# Patient Record
Sex: FEMALE | Race: WHITE | Employment: FULL TIME | URBAN - METROPOLITAN AREA
[De-identification: names, ages, dates, MRNs, and addresses within clinical notes are randomized per-mention and may not be internally consistent; named-entity substitution may affect disease eponyms.]

---

## 2017-01-05 ENCOUNTER — ROUTINE PRENATAL (OUTPATIENT)
Dept: OBGYN CLINIC | Facility: CLINIC | Age: 27
End: 2017-01-05

## 2017-01-05 VITALS
HEART RATE: 92 BPM | BODY MASS INDEX: 29 KG/M2 | SYSTOLIC BLOOD PRESSURE: 118 MMHG | DIASTOLIC BLOOD PRESSURE: 74 MMHG | WEIGHT: 182 LBS

## 2017-01-05 DIAGNOSIS — Z34.93 ENCOUNTER FOR SUPERVISION OF NORMAL PREGNANCY IN THIRD TRIMESTER, UNSPECIFIED GRAVIDITY: Primary | ICD-10-CM

## 2017-01-05 LAB
APPEARANCE: CLEAR
MULTISTIX EXPIRATION DATE: NORMAL DATE
MULTISTIX LOT#: NORMAL NUMERIC
PH, URINE: 7.5 (ref 4.5–8)
SPECIFIC GRAVITY: 1 (ref 1–1.03)
URINE-COLOR: YELLOW

## 2017-01-05 NOTE — PROGRESS NOTES
TDAP at next visit. Reviewed labs. Patient with some swelling but doing better. Thinks she is having a girl told by [address]  that she is 85% its a girl.   RTC 2 wks

## 2017-01-16 ENCOUNTER — ROUTINE PRENATAL (OUTPATIENT)
Dept: OBGYN CLINIC | Facility: CLINIC | Age: 27
End: 2017-01-16

## 2017-01-16 VITALS
SYSTOLIC BLOOD PRESSURE: 121 MMHG | WEIGHT: 181 LBS | HEART RATE: 72 BPM | BODY MASS INDEX: 29 KG/M2 | DIASTOLIC BLOOD PRESSURE: 72 MMHG

## 2017-01-16 DIAGNOSIS — Z34.93 ENCOUNTER FOR SUPERVISION OF NORMAL PREGNANCY IN THIRD TRIMESTER, UNSPECIFIED GRAVIDITY: Primary | ICD-10-CM

## 2017-01-16 LAB
MULTISTIX EXPIRATION DATE: NORMAL DATE
MULTISTIX LOT#: NORMAL NUMERIC
PH, URINE: 6 (ref 4.5–8)
SPECIFIC GRAVITY: 1.01 (ref 1–1.03)
UROBILINOGEN,SEMI-QN: 0.2 MG/DL (ref 0–1.9)

## 2017-01-31 ENCOUNTER — ROUTINE PRENATAL (OUTPATIENT)
Dept: OBGYN CLINIC | Facility: CLINIC | Age: 27
End: 2017-01-31

## 2017-01-31 VITALS
SYSTOLIC BLOOD PRESSURE: 129 MMHG | DIASTOLIC BLOOD PRESSURE: 85 MMHG | WEIGHT: 184 LBS | BODY MASS INDEX: 29 KG/M2 | HEART RATE: 86 BPM

## 2017-01-31 DIAGNOSIS — Z34.83 ENCOUNTER FOR SUPERVISION OF OTHER NORMAL PREGNANCY IN THIRD TRIMESTER: Primary | ICD-10-CM

## 2017-01-31 LAB
APPEARANCE: CLEAR
MULTISTIX LOT#: ABNORMAL NUMERIC
PH, URINE: 7 (ref 4.5–8)
SPECIFIC GRAVITY: 1 (ref 1–1.03)
URINE-COLOR: YELLOW

## 2017-01-31 PROCEDURE — 90715 TDAP VACCINE 7 YRS/> IM: CPT | Performed by: OBSTETRICS & GYNECOLOGY

## 2017-01-31 PROCEDURE — 90471 IMMUNIZATION ADMIN: CPT | Performed by: OBSTETRICS & GYNECOLOGY

## 2017-01-31 NOTE — PROGRESS NOTES
Pt tolerated Tdap vaccine well to left deltoid. Pt had no adverse reactions and VIS sheet given to pt.

## 2017-02-14 ENCOUNTER — ROUTINE PRENATAL (OUTPATIENT)
Dept: OBGYN CLINIC | Facility: CLINIC | Age: 27
End: 2017-02-14

## 2017-02-14 VITALS
WEIGHT: 185 LBS | HEART RATE: 80 BPM | SYSTOLIC BLOOD PRESSURE: 123 MMHG | BODY MASS INDEX: 29 KG/M2 | DIASTOLIC BLOOD PRESSURE: 75 MMHG

## 2017-02-14 DIAGNOSIS — Z34.03 ENCOUNTER FOR SUPERVISION OF NORMAL FIRST PREGNANCY IN THIRD TRIMESTER: Primary | ICD-10-CM

## 2017-02-14 LAB
APPEARANCE: CLEAR
MULTISTIX LOT#: NORMAL NUMERIC
URINE-COLOR: YELLOW

## 2017-02-28 ENCOUNTER — APPOINTMENT (OUTPATIENT)
Dept: LAB | Facility: HOSPITAL | Age: 27
End: 2017-02-28
Attending: OBSTETRICS & GYNECOLOGY
Payer: COMMERCIAL

## 2017-02-28 ENCOUNTER — ROUTINE PRENATAL (OUTPATIENT)
Dept: OBGYN CLINIC | Facility: CLINIC | Age: 27
End: 2017-02-28

## 2017-02-28 VITALS
WEIGHT: 191 LBS | DIASTOLIC BLOOD PRESSURE: 73 MMHG | BODY MASS INDEX: 30 KG/M2 | SYSTOLIC BLOOD PRESSURE: 119 MMHG | HEART RATE: 73 BPM

## 2017-02-28 DIAGNOSIS — Z34.03 ENCOUNTER FOR SUPERVISION OF NORMAL FIRST PREGNANCY IN THIRD TRIMESTER: ICD-10-CM

## 2017-02-28 DIAGNOSIS — Z34.03 ENCOUNTER FOR SUPERVISION OF NORMAL FIRST PREGNANCY IN THIRD TRIMESTER: Primary | ICD-10-CM

## 2017-02-28 LAB
APPEARANCE: CLEAR
ERYTHROCYTE [DISTWIDTH] IN BLOOD BY AUTOMATED COUNT: 15.2 % (ref 11–15)
HCT VFR BLD AUTO: 28.3 % (ref 35–48)
HGB BLD-MCNC: 9 G/DL (ref 12–16)
MCH RBC QN AUTO: 23.6 PG (ref 27–32)
MCHC RBC AUTO-ENTMCNC: 31.6 G/DL (ref 32–37)
MCV RBC AUTO: 74.7 FL (ref 80–100)
MULTISTIX LOT#: NORMAL NUMERIC
PLATELET # BLD AUTO: 218 K/UL (ref 140–400)
PMV BLD AUTO: 7.8 FL (ref 7.4–10.3)
RBC # BLD AUTO: 3.79 M/UL (ref 3.7–5.4)
URINE-COLOR: YELLOW
WBC # BLD AUTO: 12.1 K/UL (ref 4–11)

## 2017-02-28 PROCEDURE — 85027 COMPLETE CBC AUTOMATED: CPT

## 2017-02-28 PROCEDURE — 36415 COLL VENOUS BLD VENIPUNCTURE: CPT

## 2017-02-28 PROCEDURE — 86780 TREPONEMA PALLIDUM: CPT

## 2017-02-28 NOTE — PROGRESS NOTES
C/o leaking fluid and discharge. Negative nitrizine and pooling. GBS done today. Recommend Iron daily for patient. Increase hydration from cramping. Reviewed kick counts.    RTC 1 wk

## 2017-03-01 LAB — T PALLIDUM AB SER QL: NEGATIVE

## 2017-03-07 ENCOUNTER — ROUTINE PRENATAL (OUTPATIENT)
Dept: OBGYN CLINIC | Facility: CLINIC | Age: 27
End: 2017-03-07

## 2017-03-07 VITALS
HEART RATE: 77 BPM | WEIGHT: 194.63 LBS | DIASTOLIC BLOOD PRESSURE: 78 MMHG | SYSTOLIC BLOOD PRESSURE: 120 MMHG | BODY MASS INDEX: 31 KG/M2

## 2017-03-07 DIAGNOSIS — Z34.93 ENCOUNTER FOR SUPERVISION OF NORMAL PREGNANCY IN THIRD TRIMESTER, UNSPECIFIED GRAVIDITY: Primary | ICD-10-CM

## 2017-03-07 LAB
MULTISTIX LOT#: NORMAL NUMERIC
PH, URINE: 6 (ref 4.5–8)
SPECIFIC GRAVITY: 1.01 (ref 1–1.03)
UROBILINOGEN,SEMI-QN: 0.2 MG/DL (ref 0–1.9)

## 2017-03-13 ENCOUNTER — TELEPHONE (OUTPATIENT)
Dept: OBGYN CLINIC | Facility: CLINIC | Age: 27
End: 2017-03-13

## 2017-03-13 NOTE — TELEPHONE ENCOUNTER
Pt 37w4d calling to report that she started experiencing sharp LLQ abdominal pain that started suddenly while she was standing at work that started 15 minutes ago. Pt stated when the pain first started it was an 8/10 and now is about 5-6/10.  Pt denies LOF

## 2017-03-14 ENCOUNTER — ROUTINE PRENATAL (OUTPATIENT)
Dept: OBGYN CLINIC | Facility: CLINIC | Age: 27
End: 2017-03-14

## 2017-03-14 VITALS
BODY MASS INDEX: 31 KG/M2 | DIASTOLIC BLOOD PRESSURE: 83 MMHG | HEART RATE: 80 BPM | SYSTOLIC BLOOD PRESSURE: 134 MMHG | WEIGHT: 197.38 LBS

## 2017-03-14 DIAGNOSIS — Z34.93 ENCOUNTER FOR SUPERVISION OF NORMAL PREGNANCY IN THIRD TRIMESTER, UNSPECIFIED GRAVIDITY: Primary | ICD-10-CM

## 2017-03-14 LAB
APPEARANCE: CLEAR
MULTISTIX EXPIRATION DATE: NORMAL DATE
MULTISTIX LOT#: NORMAL NUMERIC
PH, URINE: 6.5 (ref 4.5–8)
SPECIFIC GRAVITY: 1 (ref 1–1.03)
URINE-COLOR: YELLOW

## 2017-03-21 ENCOUNTER — ROUTINE PRENATAL (OUTPATIENT)
Dept: OBGYN CLINIC | Facility: CLINIC | Age: 27
End: 2017-03-21

## 2017-03-21 VITALS
HEART RATE: 77 BPM | BODY MASS INDEX: 31 KG/M2 | SYSTOLIC BLOOD PRESSURE: 134 MMHG | DIASTOLIC BLOOD PRESSURE: 84 MMHG | WEIGHT: 198 LBS

## 2017-03-21 DIAGNOSIS — Z34.93 ENCOUNTER FOR SUPERVISION OF NORMAL PREGNANCY IN THIRD TRIMESTER, UNSPECIFIED GRAVIDITY: Primary | ICD-10-CM

## 2017-03-21 LAB
MULTISTIX LOT#: NORMAL NUMERIC
PH, URINE: 7 (ref 4.5–8)
PROTEIN (URINE DIPSTICK): 30 MG/DL
SPECIFIC GRAVITY: 1.01 (ref 1–1.03)
UROBILINOGEN,SEMI-QN: 0.2 MG/DL (ref 0–1.9)

## 2017-03-28 ENCOUNTER — ROUTINE PRENATAL (OUTPATIENT)
Dept: OBGYN CLINIC | Facility: CLINIC | Age: 27
End: 2017-03-28

## 2017-03-28 VITALS
DIASTOLIC BLOOD PRESSURE: 85 MMHG | WEIGHT: 198 LBS | SYSTOLIC BLOOD PRESSURE: 136 MMHG | HEART RATE: 80 BPM | BODY MASS INDEX: 31 KG/M2

## 2017-03-28 DIAGNOSIS — Z34.93 ENCOUNTER FOR SUPERVISION OF NORMAL PREGNANCY IN THIRD TRIMESTER, UNSPECIFIED GRAVIDITY: Primary | ICD-10-CM

## 2017-03-28 LAB
MULTISTIX LOT#: NORMAL NUMERIC
PH, URINE: 6.5 (ref 4.5–8)
SPECIFIC GRAVITY: 1.01 (ref 1–1.03)
UROBILINOGEN,SEMI-QN: 0.2 MG/DL (ref 0–1.9)

## 2017-04-02 ENCOUNTER — ANESTHESIA EVENT (OUTPATIENT)
Dept: OBGYN UNIT | Facility: HOSPITAL | Age: 27
End: 2017-04-02
Payer: COMMERCIAL

## 2017-04-02 ENCOUNTER — HOSPITAL ENCOUNTER (INPATIENT)
Facility: HOSPITAL | Age: 27
LOS: 2 days | Discharge: HOME | End: 2017-04-04
Attending: OBSTETRICS & GYNECOLOGY | Admitting: OBSTETRICS & GYNECOLOGY
Payer: COMMERCIAL

## 2017-04-02 ENCOUNTER — ANESTHESIA (OUTPATIENT)
Dept: OBGYN UNIT | Facility: HOSPITAL | Age: 27
End: 2017-04-02
Payer: COMMERCIAL

## 2017-04-02 DIAGNOSIS — Z34.90 PREGNANCY, UNSPECIFIED GESTATIONAL AGE: Primary | ICD-10-CM

## 2017-04-02 PROCEDURE — 0UQMXZZ REPAIR VULVA, EXTERNAL APPROACH: ICD-10-PCS | Performed by: OBSTETRICS & GYNECOLOGY

## 2017-04-02 PROCEDURE — 86850 RBC ANTIBODY SCREEN: CPT | Performed by: OBSTETRICS & GYNECOLOGY

## 2017-04-02 PROCEDURE — 86901 BLOOD TYPING SEROLOGIC RH(D): CPT | Performed by: OBSTETRICS & GYNECOLOGY

## 2017-04-02 PROCEDURE — 84460 ALANINE AMINO (ALT) (SGPT): CPT | Performed by: OBSTETRICS & GYNECOLOGY

## 2017-04-02 PROCEDURE — 84450 TRANSFERASE (AST) (SGOT): CPT | Performed by: OBSTETRICS & GYNECOLOGY

## 2017-04-02 PROCEDURE — 86900 BLOOD TYPING SEROLOGIC ABO: CPT | Performed by: OBSTETRICS & GYNECOLOGY

## 2017-04-02 PROCEDURE — 0KQM0ZZ REPAIR PERINEUM MUSCLE, OPEN APPROACH: ICD-10-PCS | Performed by: OBSTETRICS & GYNECOLOGY

## 2017-04-02 PROCEDURE — 85027 COMPLETE CBC AUTOMATED: CPT | Performed by: OBSTETRICS & GYNECOLOGY

## 2017-04-02 RX ORDER — TERBUTALINE SULFATE 1 MG/ML
0.25 INJECTION, SOLUTION SUBCUTANEOUS AS NEEDED
Status: DISCONTINUED | OUTPATIENT
Start: 2017-04-02 | End: 2017-04-03

## 2017-04-02 RX ORDER — SODIUM CHLORIDE, SODIUM LACTATE, POTASSIUM CHLORIDE, CALCIUM CHLORIDE 600; 310; 30; 20 MG/100ML; MG/100ML; MG/100ML; MG/100ML
INJECTION, SOLUTION INTRAVENOUS
Status: COMPLETED
Start: 2017-04-02 | End: 2017-04-02

## 2017-04-02 RX ORDER — SODIUM CHLORIDE 0.9 % (FLUSH) 0.9 %
10 SYRINGE (ML) INJECTION AS NEEDED
Status: DISCONTINUED | OUTPATIENT
Start: 2017-04-02 | End: 2017-04-03

## 2017-04-02 RX ORDER — LIDOCAINE HYDROCHLORIDE 10 MG/ML
30 INJECTION, SOLUTION EPIDURAL; INFILTRATION; INTRACAUDAL; PERINEURAL ONCE
Status: DISCONTINUED | OUTPATIENT
Start: 2017-04-02 | End: 2017-04-03

## 2017-04-02 RX ORDER — NALBUPHINE HCL 10 MG/ML
2.5 AMPUL (ML) INJECTION
Status: DISCONTINUED | OUTPATIENT
Start: 2017-04-02 | End: 2017-04-04

## 2017-04-02 RX ORDER — DEXTROSE, SODIUM CHLORIDE, SODIUM LACTATE, POTASSIUM CHLORIDE, AND CALCIUM CHLORIDE 5; .6; .31; .03; .02 G/100ML; G/100ML; G/100ML; G/100ML; G/100ML
125 INJECTION, SOLUTION INTRAVENOUS CONTINUOUS
Status: DISCONTINUED | OUTPATIENT
Start: 2017-04-02 | End: 2017-04-03

## 2017-04-02 RX ORDER — LIDOCAINE HYDROCHLORIDE AND EPINEPHRINE 15; 5 MG/ML; UG/ML
INJECTION, SOLUTION EPIDURAL AS NEEDED
Status: DISCONTINUED | OUTPATIENT
Start: 2017-04-02 | End: 2017-04-03 | Stop reason: SURG

## 2017-04-02 RX ORDER — AMMONIA INHALANTS 0.04 G/.3ML
0.3 INHALANT RESPIRATORY (INHALATION) AS NEEDED
Status: DISCONTINUED | OUTPATIENT
Start: 2017-04-02 | End: 2017-04-03

## 2017-04-02 RX ORDER — BUPIVACAINE HYDROCHLORIDE 2.5 MG/ML
INJECTION, SOLUTION EPIDURAL; INFILTRATION; INTRACAUDAL
Status: COMPLETED
Start: 2017-04-02 | End: 2017-04-02

## 2017-04-02 RX ORDER — EPHEDRINE SULFATE/0.9% NACL/PF 25 MG/5 ML
5 SYRINGE (ML) INTRAVENOUS AS NEEDED
Status: DISCONTINUED | OUTPATIENT
Start: 2017-04-02 | End: 2017-04-03

## 2017-04-02 RX ORDER — IBUPROFEN 600 MG/1
600 TABLET ORAL ONCE AS NEEDED
Status: DISCONTINUED | OUTPATIENT
Start: 2017-04-02 | End: 2017-04-03

## 2017-04-02 RX ADMIN — BUPIVACAINE HYDROCHLORIDE 10 ML: 2.5 INJECTION, SOLUTION EPIDURAL; INFILTRATION; INTRACAUDAL at 19:20:00

## 2017-04-02 RX ADMIN — LIDOCAINE HYDROCHLORIDE AND EPINEPHRINE 5 ML: 15; 5 INJECTION, SOLUTION EPIDURAL at 19:20:00

## 2017-04-02 NOTE — PROGRESS NOTES
Pt is a 23year old female admitted to  E  . Patient presents with:  R/o Labor: uc's starting ar 0400     Pt is  40w3d intra-uterine pregnancy. History obtained, consents signed. Oriented to room, staff, and plan of care.

## 2017-04-02 NOTE — H&P
[facility] Patient Status:  Inpatient    4/15/1990 MRN [id number]   Location [address] C-D Attending Jarvis Stewart MD   Hosp Day # 0 PCP Tiffanie Stevens MD     Date of Admission:  2017 6/C/-1/0    Ctx q 2-3 min  Fetal heart tones:  Baseline 130-140s  Fetal heart variability: moderate and reactive  Decelerations: No      Lab Review:    Results for orders placed or performed during the hospital encounter of 04/02/17  -CBC, PLATELET; NO DIF

## 2017-04-03 PROCEDURE — 85025 COMPLETE CBC W/AUTO DIFF WBC: CPT | Performed by: OBSTETRICS & GYNECOLOGY

## 2017-04-03 PROCEDURE — 51702 INSERT TEMP BLADDER CATH: CPT

## 2017-04-03 PROCEDURE — 36415 COLL VENOUS BLD VENIPUNCTURE: CPT

## 2017-04-03 RX ORDER — DIAPER,BRIEF,INFANT-TODD,DISP
1 EACH MISCELLANEOUS EVERY 6 HOURS PRN
Status: DISCONTINUED | OUTPATIENT
Start: 2017-04-03 | End: 2017-04-04

## 2017-04-03 RX ORDER — SIMETHICONE 80 MG
80 TABLET,CHEWABLE ORAL 3 TIMES DAILY PRN
Status: DISCONTINUED | OUTPATIENT
Start: 2017-04-03 | End: 2017-04-04

## 2017-04-03 RX ORDER — AMMONIA INHALANTS 0.04 G/.3ML
0.3 INHALANT RESPIRATORY (INHALATION) AS NEEDED
Status: DISCONTINUED | OUTPATIENT
Start: 2017-04-03 | End: 2017-04-04

## 2017-04-03 RX ORDER — ONDANSETRON 2 MG/ML
4 INJECTION INTRAMUSCULAR; INTRAVENOUS EVERY 6 HOURS PRN
Status: DISCONTINUED | OUTPATIENT
Start: 2017-04-03 | End: 2017-04-04

## 2017-04-03 RX ORDER — IBUPROFEN 600 MG/1
600 TABLET ORAL EVERY 6 HOURS PRN
Status: DISCONTINUED | OUTPATIENT
Start: 2017-04-03 | End: 2017-04-04

## 2017-04-03 RX ORDER — DOCUSATE SODIUM 100 MG/1
100 CAPSULE, LIQUID FILLED ORAL
Status: DISCONTINUED | OUTPATIENT
Start: 2017-04-03 | End: 2017-04-04

## 2017-04-03 RX ORDER — HYDROCODONE BITARTRATE AND ACETAMINOPHEN 5; 325 MG/1; MG/1
1 TABLET ORAL EVERY 4 HOURS PRN
Status: DISCONTINUED | OUTPATIENT
Start: 2017-04-03 | End: 2017-04-04

## 2017-04-03 RX ORDER — PRENATAL VIT,CAL 76/IRON/FOLIC 29 MG-1 MG
1 TABLET ORAL DAILY
Status: DISCONTINUED | OUTPATIENT
Start: 2017-04-03 | End: 2017-04-04

## 2017-04-03 RX ORDER — SODIUM CHLORIDE 0.9 % (FLUSH) 0.9 %
10 SYRINGE (ML) INJECTION AS NEEDED
Status: DISCONTINUED | OUTPATIENT
Start: 2017-04-03 | End: 2017-04-04

## 2017-04-03 NOTE — ANESTHESIA POSTPROCEDURE EVALUATION
Patient: Rajinder Searing    Procedure Summary     Date Anesthesia Start Anesthesia Stop Room / Location    04/02/17 1920         Procedure Diagnosis Scheduled Providers Responsible Provider    ANESTHESIA LABOR ANALGESIA No diagnosis on file.   Giselle Benitez,

## 2017-04-03 NOTE — ANESTHESIA PREPROCEDURE EVALUATION
Anesthesia PreOp Note    HPI:     Debra Solis is a 23year old female who presents for preoperative consultation requested by: * No surgeons listed *    Date of Surgery: * No dates entered *    * No procedures listed *  Indication: * No pre-op diagnosis Octavio Jara MD     fentaNYL citrate (SUBLIMAZE) 0.05 MG/ML injection 100 mcg 100 mcg Intravenous Once Octavio Jara MD     lactated ringers infusion           No current [facility]-ordered outpatient prescriptions on file.     No Known Allergies    No family histor Discussed With:  Patient and spouse      I have informed Jase Agustin  of the nature of the anesthetic plan, benefits, risks, major complications, and any alternative forms of anesthetic management.    All of the patient's questions were answered to the be

## 2017-04-03 NOTE — LACTATION NOTE
LACTATION NOTE - MOTHER           Problems identified  Problems identified: Knowledge deficit; Nipple pain; Nipple trauma    Maternal history  Maternal history: Anxiety    Breastfeeding goal  Breastfeeding goal: To maintain breast milk feeding per patient go

## 2017-04-03 NOTE — PROGRESS NOTES
Beside report received from ROLAND Whitney Bed in locked and low position. Side rails up x 2. VSS. Fundus firm at u/u, lochia small, no clots noted. IV site unremarkable. Pain level 5.  Baby girl present at bedside in open crib, ID bands checked and verified

## 2017-04-03 NOTE — PLAN OF CARE
Problem: POSTPARTUM  Goal: Optimize infant feeding at the breast  INTERVENTIONS:  - Initiate breast feeding within first hour after birth. - Monitor effectiveness of current breast feeding efforts. - Assess support systems available to mother/family.   - regarding lactation, letdown techniques, maternal food preferences.   - Assess mother’s knowledge and previous experience with breast feeding.  - Provide information as needed about early infant feeding cues (e.g., rooting, lip smacking, sucking fingers/ang

## 2017-04-03 NOTE — PROGRESS NOTES
[place] [facility]    Post  Progress Note      Silver Garcia Patient Status:  Inpatient    4/15/1990 MRN [id number]   Location [facility] 3SE Attending Som Kemp MD   Hosp Day # 1 PCP Abdulaziz Brown MD       Subjective     Good marcy

## 2017-04-03 NOTE — LACTATION NOTE
This note was copied from the chart of Girl  Mendel Cobian.   LACTATION NOTE - INFANT    Evaluation Type  Evaluation Type: Inpatient    Problems & Assessment  Problems Diagnosed or Identified: Shallow latch  Infant Assessment: Good skin turgor;Skin color: pink or shallow,difficulty latching causing nipple pain and damage. Initially baby curling upper lip under,sucking on lip or mom's areola causing bruising. LC showed mom how to gently carolina lips to promote deeper more comfortable latch.  Mom able to HE drops colost

## 2017-04-03 NOTE — PLAN OF CARE
Problem: POSTPARTUM  Goal: Optimize infant feeding at the breast  INTERVENTIONS:  - Initiate breast feeding within first hour after birth. - Monitor effectiveness of current breast feeding efforts. - Assess support systems available to mother/family.   - with breast feeding.  - Provide information as needed about early infant feeding cues (e.g., rooting, lip smacking, sucking fingers/hand) versus late cue of crying.  - Discuss/demonstrate breast feeding aids (e.g., infant sling, nursing footstool/pillows,

## 2017-04-03 NOTE — ANESTHESIA PROCEDURE NOTES
Labor Analgesia  Performed by: Vaishnavi Muñiz  Authorized by: Vaishnavi Muñiz    Patient Location:  OB  Start Time:  4/2/2017 7:20 PM  End Time:  4/2/2017 7:30 PM  Site Identification: surface landmarks    Reason for Block: labor epidural    Anesthesiologis

## 2017-04-03 NOTE — L&D DELIVERY NOTE
[facility]    Vaginal Delivery Note    Daniel Robles Patient Status:  Inpatient    4/15/1990 MRN [id number]   Location [facility] Attending Samantha Hess MD   Hosp Day # 0 PCP Tee Kevin MD       Delivery     Infant  Dionicio

## 2017-04-03 NOTE — DISCHARGE SUMMARY
[facility]    Discharge Summary    Magda Thapa Patient Status:  Inpatient    4/15/1990 MRN [id number]   Location [facility] Attending Jaun Chapman MD   Hosp Day # 0       Delivering OB Clinician:  Dr Aman Wray 39: Estimated

## 2017-04-04 VITALS
HEART RATE: 78 BPM | SYSTOLIC BLOOD PRESSURE: 121 MMHG | DIASTOLIC BLOOD PRESSURE: 64 MMHG | TEMPERATURE: 97 F | RESPIRATION RATE: 18 BRPM

## 2017-04-04 RX ORDER — IBUPROFEN 600 MG/1
600 TABLET ORAL EVERY 6 HOURS PRN
Qty: 30 TABLET | Refills: 0 | Status: SHIPPED | OUTPATIENT
Start: 2017-04-04 | End: 2017-05-15 | Stop reason: ALTCHOICE

## 2017-04-04 RX ORDER — PSEUDOEPHEDRINE HCL 30 MG
100 TABLET ORAL 2 TIMES DAILY
Qty: 60 CAPSULE | Refills: 0 | Status: SHIPPED | OUTPATIENT
Start: 2017-04-04 | End: 2017-05-15 | Stop reason: ALTCHOICE

## 2017-04-04 RX ORDER — HYDROCODONE BITARTRATE AND ACETAMINOPHEN 5; 325 MG/1; MG/1
1 TABLET ORAL EVERY 6 HOURS PRN
Qty: 15 TABLET | Refills: 0 | Status: SHIPPED | OUTPATIENT
Start: 2017-04-04 | End: 2017-05-15 | Stop reason: ALTCHOICE

## 2017-04-04 NOTE — LACTATION NOTE
LACTATION NOTE - MOTHER           Problems identified  Problems identified: Knowledge deficit; Nipple trauma  Problems Identified Other: nipple damage healing    Maternal history  Maternal history: Anxiety    Breastfeeding goal  Breastfeeding goal: To maint group.

## 2017-04-04 NOTE — LACTATION NOTE
This note was copied from the chart of Erlin Adams.   LACTATION NOTE - INFANT    Evaluation Type  Evaluation Type: Inpatient    Problems & Assessment  Problems Diagnosed or Identified: Latch difficulty  Infant Assessment: Minimal hunger cues present;Skin c management; Outpatient lactation clinic handout  Evaluation of education: Mother verbalized understanding;Significant other included in teaching

## 2017-04-11 ENCOUNTER — TELEPHONE (OUTPATIENT)
Dept: OBGYN CLINIC | Facility: CLINIC | Age: 27
End: 2017-04-11

## 2017-04-11 NOTE — TELEPHONE ENCOUNTER
----- Message from [address] [place] [address] [address] Generic sent at 4/11/2017  6:00 AM CDT -----  Regarding: Other  Contact: [phone]  I had my baby last Sunday (4-2), and I have a vague recollection of the doctor saying I will experience some cramps and pain for about the Claudis Stinson

## 2017-04-15 NOTE — TELEPHONE ENCOUNTER
Pt had vaginal delivery with JLK on 4/2 and is calling today to report \"a bad ache/pressure\" right below her tailbone and into her anus area for the past week. Pt stated she notices the pain after urination or right before she has a BM and during BMs.  Pt

## 2017-04-15 NOTE — TELEPHONE ENCOUNTER
Pt informed of JLKs recs and verbalized understanding. Pt instructed to call office if recs do not help pain or if pain worsens.

## 2017-05-06 ENCOUNTER — HOSPITAL ENCOUNTER (OUTPATIENT)
Age: 27
Discharge: OTHER INSTITUTION | End: 2017-05-06
Attending: EMERGENCY MEDICINE
Payer: COMMERCIAL

## 2017-05-06 ENCOUNTER — APPOINTMENT (OUTPATIENT)
Dept: GENERAL RADIOLOGY | Age: 27
End: 2017-05-06
Attending: EMERGENCY MEDICINE
Payer: COMMERCIAL

## 2017-05-06 VITALS
TEMPERATURE: 97 F | BODY MASS INDEX: 25.71 KG/M2 | WEIGHT: 160 LBS | DIASTOLIC BLOOD PRESSURE: 66 MMHG | HEIGHT: 66 IN | RESPIRATION RATE: 18 BRPM | HEART RATE: 96 BPM | OXYGEN SATURATION: 100 % | SYSTOLIC BLOOD PRESSURE: 116 MMHG

## 2017-05-06 DIAGNOSIS — R07.9 RIGHT-SIDED CHEST PAIN: Primary | ICD-10-CM

## 2017-05-06 PROCEDURE — 99215 OFFICE O/P EST HI 40 MIN: CPT

## 2017-05-06 PROCEDURE — 99205 OFFICE O/P NEW HI 60 MIN: CPT

## 2017-05-06 PROCEDURE — 71020 XR CHEST PA + LAT CHEST (CPT=71020): CPT | Performed by: EMERGENCY MEDICINE

## 2017-05-06 NOTE — ED PROVIDER NOTES
Patient Seen in: [facility]    History   Patient presents with:   Other    Stated Complaint: Rib pain    HPI    The patient is a 52-year-old female who is 5 weeks postpartum complaints of sudden onset right parasternal ch reviewed and negative except as noted above. PSFH elements reviewed from today and agreed except as otherwise stated in HPI.     Physical Exam       ED Triage Vitals   BP 05/06/17 1129 116/66 mmHg   Pulse 05/06/17 1129 96   Resp 05/06/17 1129 18   Temp 0

## 2017-05-06 NOTE — ED INITIAL ASSESSMENT (HPI)
Pt has pain above right breast, radiating to right shoulder and right rib cage. Pt denies trauma. Pt has limited range of motion r/t pain. Pt denies SOB. Pain NOS, might have picked up daughter but unsure.

## 2017-05-15 ENCOUNTER — POSTPARTUM (OUTPATIENT)
Dept: OBGYN CLINIC | Facility: CLINIC | Age: 27
End: 2017-05-15

## 2017-05-15 VITALS
WEIGHT: 164 LBS | SYSTOLIC BLOOD PRESSURE: 107 MMHG | HEART RATE: 90 BPM | BODY MASS INDEX: 26 KG/M2 | DIASTOLIC BLOOD PRESSURE: 71 MMHG

## 2017-05-15 RX ORDER — MULTIVITAMIN
1 TABLET ORAL
COMMUNITY
End: 2017-05-15 | Stop reason: ALTCHOICE

## 2017-05-15 RX ORDER — FERROUS SULFATE 325(65) MG
325 TABLET ORAL
COMMUNITY
End: 2017-08-09

## 2017-05-15 NOTE — PROGRESS NOTES
DEVON    Candida Walton is a 33year old female  here for 6 week post-partum visit. Patient delivered a  female infant on 17 by . Patient desires condoms for contraception. Patient is breast feeding.    Patient No symptoms of depression

## 2017-05-19 ENCOUNTER — TELEPHONE (OUTPATIENT)
Dept: OBGYN CLINIC | Facility: CLINIC | Age: 27
End: 2017-05-19

## 2017-05-19 NOTE — TELEPHONE ENCOUNTER
Pt states she is exclusively breastfeeding and asking if it is normal to get a period. Pt reassured that some women get a period while breast feeding and it is normal. Pt advised to call us if bleeding becomes heavy and she soaks through a pad/hour.  Pt als

## 2017-05-19 NOTE — TELEPHONE ENCOUNTER
7 week post partum, 100 % breast feeding and pt just got her period she is not sure if that is normal ?

## 2017-06-30 ENCOUNTER — OFFICE VISIT (OUTPATIENT)
Dept: OTOLARYNGOLOGY | Facility: CLINIC | Age: 27
End: 2017-06-30

## 2017-06-30 VITALS
HEART RATE: 90 BPM | HEIGHT: 66 IN | TEMPERATURE: 99 F | DIASTOLIC BLOOD PRESSURE: 64 MMHG | WEIGHT: 164 LBS | BODY MASS INDEX: 26.36 KG/M2 | SYSTOLIC BLOOD PRESSURE: 111 MMHG

## 2017-06-30 DIAGNOSIS — R49.0 HOARSENESS: Primary | ICD-10-CM

## 2017-06-30 PROCEDURE — 31575 DIAGNOSTIC LARYNGOSCOPY: CPT | Performed by: OTOLARYNGOLOGY

## 2017-06-30 PROCEDURE — 99243 OFF/OP CNSLTJ NEW/EST LOW 30: CPT | Performed by: OTOLARYNGOLOGY

## 2017-08-07 ENCOUNTER — TELEPHONE (OUTPATIENT)
Dept: OBGYN CLINIC | Facility: CLINIC | Age: 27
End: 2017-08-07

## 2017-08-07 NOTE — TELEPHONE ENCOUNTER
Pt calling to report that 2 nights ago after she had IC she noticed vaginal bleeding and cramping. Pt stated she has had \"spotting\" after IC in the past but it was usually when pt \"was dry\" and did not have any lubricant.  Pt stated that she was not dry

## 2017-08-07 NOTE — TELEPHONE ENCOUNTER
Bleeding after intercourse and cramping enough that she has to wipe a couple times for blood to be clear. First time there has been this much blood. Has spotted before when there was not enough lubricant. But there was enough lubricant this time.  4 months

## 2017-08-08 NOTE — TELEPHONE ENCOUNTER
Appt scheduled with KCB tomorrow at 10 at [facility]. Pt will check on referral as was told by PCP needs referral. Pt to call and reschedule if referral not authorized yet.

## 2017-08-09 ENCOUNTER — TELEPHONE (OUTPATIENT)
Dept: OBGYN CLINIC | Facility: CLINIC | Age: 27
End: 2017-08-09

## 2017-08-09 ENCOUNTER — OFFICE VISIT (OUTPATIENT)
Dept: OBGYN CLINIC | Facility: CLINIC | Age: 27
End: 2017-08-09

## 2017-08-09 VITALS
BODY MASS INDEX: 24 KG/M2 | WEIGHT: 146 LBS | DIASTOLIC BLOOD PRESSURE: 69 MMHG | HEART RATE: 81 BPM | SYSTOLIC BLOOD PRESSURE: 114 MMHG

## 2017-08-09 DIAGNOSIS — K60.2 ANAL FISSURE: Primary | ICD-10-CM

## 2017-08-09 DIAGNOSIS — N93.0 POSTCOITAL BLEEDING: Primary | ICD-10-CM

## 2017-08-09 DIAGNOSIS — K60.2 ANAL FISSURE: ICD-10-CM

## 2017-08-09 PROCEDURE — 99213 OFFICE O/P EST LOW 20 MIN: CPT | Performed by: OBSTETRICS & GYNECOLOGY

## 2017-08-09 NOTE — PROGRESS NOTES
Tawanna Falcon is a 33year old female [id number] Patient's last menstrual period was 05/18/2017. Patient presents with:  Gyn Problem: bleeding after intercourse   Patient presents today for postcoital bleeding.  She states she has had this rarely since d control/ protection: Condom     Other Topics Concern   None on file     Social History Narrative   None on file       MEDICATIONS:    Current Outpatient Prescriptions:   •  acetaminophen 500 MG Oral Tab, Take 1,000 mg by mouth., Disp: , Rfl:   •  PRENATAL US    Anal Fissure  1.  Referral to colo-rectal surgeon

## 2017-08-10 NOTE — TELEPHONE ENCOUNTER
Lmtcb.     Stat referral for rectal colon surgery in process, patient can call [phone] to schedule an appointment.

## 2017-08-11 LAB
C TRACH DNA SPEC QL NAA+PROBE: NEGATIVE
GENITAL VAGINOSIS SCREEN: NEGATIVE
N GONORRHOEA DNA SPEC QL NAA+PROBE: NEGATIVE
TRICHOMONAS SCREEN: NEGATIVE

## 2017-08-14 ENCOUNTER — TELEPHONE (OUTPATIENT)
Dept: OBGYN CLINIC | Facility: CLINIC | Age: 27
End: 2017-08-14

## 2017-08-14 NOTE — TELEPHONE ENCOUNTER
----- Message from Shine Washington MD sent at 8/14/2017  9:38 AM CDT -----  Please let patient know that all her testing was negative (pap smear, gc/ct and vaginal culture).  If she continues to have postcoital bleeding she will need to call and we can get

## 2017-08-22 ENCOUNTER — OFFICE VISIT (OUTPATIENT)
Dept: SURGERY | Facility: CLINIC | Age: 27
End: 2017-08-22

## 2017-08-22 VITALS — HEIGHT: 66 IN | BODY MASS INDEX: 22.5 KG/M2 | WEIGHT: 140 LBS

## 2017-08-22 DIAGNOSIS — K60.2 ANAL FISSURE: Primary | ICD-10-CM

## 2017-08-22 PROCEDURE — 99244 OFF/OP CNSLTJ NEW/EST MOD 40: CPT | Performed by: SURGERY

## 2017-08-22 PROCEDURE — 99212 OFFICE O/P EST SF 10 MIN: CPT | Performed by: SURGERY

## 2017-08-22 NOTE — PROGRESS NOTES
History and Physical      Ny Peña is a 33year old female. HPI   Patient presents with:  Anal Problem (GI): Pt referred by Dr. Jose Junior regarding anal fissure since April after the birth of her 1st child.   Pt states it feels like razor blade negatives noted in the the HPI. PHYSICAL EXAM   Body mass index is 22.6 kg/m². Patient's last menstrual period was 05/18/2017.   Constitutional: appears well hydrated alert and responsive no acute distress noted  Head/Face: normocephalic  Nose/Mouth women.  She will continue liberal water intake and fiber. F/u in 4-6 weeks.            8/22/2017  Merline Rudd, MD

## 2017-10-05 ENCOUNTER — TELEPHONE (OUTPATIENT)
Dept: OTOLARYNGOLOGY | Facility: CLINIC | Age: 27
End: 2017-10-05

## 2017-10-05 NOTE — TELEPHONE ENCOUNTER
Pt received a letter from dr Brenda Irby from my chart. Pt is not going to have the test done. Pt is no longer having the problems.

## 2018-01-04 ENCOUNTER — TELEPHONE (OUTPATIENT)
Dept: PEDIATRICS CLINIC | Facility: CLINIC | Age: 28
End: 2018-01-04

## 2018-01-04 ENCOUNTER — APPOINTMENT (OUTPATIENT)
Dept: LAB | Facility: HOSPITAL | Age: 28
End: 2018-01-04
Attending: OBSTETRICS & GYNECOLOGY
Payer: COMMERCIAL

## 2018-01-04 DIAGNOSIS — O20.0 THREATENED MISCARRIAGE: ICD-10-CM

## 2018-01-04 DIAGNOSIS — O20.0 THREATENED MISCARRIAGE: Primary | ICD-10-CM

## 2018-01-04 LAB — B-HCG SERPL-ACNC: 24.4 MIU/ML

## 2018-01-04 PROCEDURE — 84702 CHORIONIC GONADOTROPIN TEST: CPT

## 2018-01-04 PROCEDURE — 36415 COLL VENOUS BLD VENIPUNCTURE: CPT

## 2018-01-04 NOTE — TELEPHONE ENCOUNTER
Informed pt that [facility] wants her to do hcg quants and repeat them in 48 hours. Pt aware to call after the quants for the results. Informed pt call if her bleeding increases.

## 2018-01-04 NOTE — TELEPHONE ENCOUNTER
Pt states that she has pinkish red bleeding that started today. Denies cramping. Pt states that she had sex yesterday. Denies any BM. (Pt had a baby with us 9 months ago.)  Pt is breast feeding. LMP 12-1-17, 4w6d.   Paged [facility].

## 2018-01-04 NOTE — TELEPHONE ENCOUNTER
Pt's lmp was Dec, 1 and confirmed pregnancy with home test. And states she started bleeding. Please advise.

## 2018-01-06 ENCOUNTER — APPOINTMENT (OUTPATIENT)
Dept: LAB | Facility: HOSPITAL | Age: 28
End: 2018-01-06
Attending: OBSTETRICS & GYNECOLOGY
Payer: COMMERCIAL

## 2018-01-06 DIAGNOSIS — O20.0 THREATENED MISCARRIAGE: ICD-10-CM

## 2018-01-06 LAB — B-HCG SERPL-ACNC: 7.2 MIU/ML

## 2018-01-06 PROCEDURE — 36415 COLL VENOUS BLD VENIPUNCTURE: CPT

## 2018-01-06 PROCEDURE — 84702 CHORIONIC GONADOTROPIN TEST: CPT

## 2018-01-08 ENCOUNTER — TELEPHONE (OUTPATIENT)
Dept: PEDIATRICS CLINIC | Facility: CLINIC | Age: 28
End: 2018-01-08

## 2018-01-08 DIAGNOSIS — O03.9 SPONTANEOUS ABORTION: Primary | ICD-10-CM

## 2018-01-08 NOTE — TELEPHONE ENCOUNTER
Pt calling for hcg results. She had a +HPT, LMP 12/1, c/o VB and was ordered to do quants x 2. Hcg dropped from 24 to 7. Routed to [facility] on call. Pt is not yet aware of results.

## 2018-01-09 NOTE — TELEPHONE ENCOUNTER
Pt calling and states she never received a call and states ok to leave a detailed Vm.  Please advise

## 2018-01-10 NOTE — TELEPHONE ENCOUNTER
Agree with triage advice given. Called patient to check on how she is doing. Left message for her. She can call with any questions or concerns.

## 2018-01-10 NOTE — TELEPHONE ENCOUNTER
Pt contacted and results given. Pt aware Kita Galeazzi went from 24 to 7 and that this is typical of a miscarriage. Pt advised to repeat level in 1 week. Pt denies any heavy bleeding or cramping. Pt to call if soaking a pad an hour or if she has pain.  Pt asking wh

## 2018-01-20 ENCOUNTER — APPOINTMENT (OUTPATIENT)
Dept: LAB | Facility: HOSPITAL | Age: 28
End: 2018-01-20
Attending: OBSTETRICS & GYNECOLOGY
Payer: COMMERCIAL

## 2018-01-20 DIAGNOSIS — O03.9 SPONTANEOUS ABORTION: ICD-10-CM

## 2018-01-20 LAB — B-HCG SERPL-ACNC: 0.7 MIU/ML

## 2018-01-20 PROCEDURE — 84702 CHORIONIC GONADOTROPIN TEST: CPT

## 2018-01-20 PROCEDURE — 36415 COLL VENOUS BLD VENIPUNCTURE: CPT

## 2018-01-23 ENCOUNTER — OFFICE VISIT (OUTPATIENT)
Dept: OBGYN CLINIC | Facility: CLINIC | Age: 28
End: 2018-01-23

## 2018-01-23 ENCOUNTER — TELEPHONE (OUTPATIENT)
Dept: PEDIATRICS CLINIC | Facility: CLINIC | Age: 28
End: 2018-01-23

## 2018-01-23 VITALS
BODY MASS INDEX: 23 KG/M2 | SYSTOLIC BLOOD PRESSURE: 110 MMHG | HEART RATE: 65 BPM | DIASTOLIC BLOOD PRESSURE: 71 MMHG | WEIGHT: 141.81 LBS

## 2018-01-23 DIAGNOSIS — R10.2 PELVIC PAIN: Primary | ICD-10-CM

## 2018-01-23 PROCEDURE — 99213 OFFICE O/P EST LOW 20 MIN: CPT | Performed by: OBSTETRICS & GYNECOLOGY

## 2018-01-23 NOTE — TELEPHONE ENCOUNTER
C/O INTENSE, FREQUENT, SHOOTING ABDOMINAL PAIN FOR SEVERAL DAYS. WONDERED IF THIS IS A RESULT OF THE MISCARRIAGE. PT HAD NEGATIVE QUANT ON 1-20-18. SAID THE PAIN PULSES THEN DECREASES, RATES PAIN 7/10 WHEN IT IS PULSING.   SAID THE PULSING PAIN ACTUALLY

## 2018-01-24 NOTE — PROGRESS NOTES
Teri Garay is a 33year old female [id number] Patient's last menstrual period was 01/04/2018. Patient presents with:  Gyn Problem: upper mid ab pain    Last seen at [facility] visit in 2017. Had chemical pregnancy earlier this month.   [facility]  1/6 without tenderness  Adnexa: normal without masses or tenderness    Assessment & Plan:  1. Pelvic pain  Reassurance. Monitor. If pain persists, see PCP.    RTC prn        No prescriptions requested or ordered in this encounter

## 2018-03-09 ENCOUNTER — TELEPHONE (OUTPATIENT)
Dept: OBGYN CLINIC | Facility: CLINIC | Age: 28
End: 2018-03-09

## 2018-03-09 DIAGNOSIS — Z34.90 PREGNANCY, UNSPECIFIED GESTATIONAL AGE: Primary | ICD-10-CM

## 2018-03-09 NOTE — TELEPHONE ENCOUNTER
Pt confirms +HPT and LMP 2/2. She is not taking a PNV but will start today. She delivered with us in 2017 and is aware she will see all 6 MDs. She is aware the first appt is with a nurse.  She accepted an OBN PC appt and w2ill go to lab for serum hcg at [place] [address]

## 2018-03-23 ENCOUNTER — APPOINTMENT (OUTPATIENT)
Dept: LAB | Facility: HOSPITAL | Age: 28
End: 2018-03-23
Attending: OBSTETRICS & GYNECOLOGY
Payer: COMMERCIAL

## 2018-03-23 DIAGNOSIS — Z34.90 PREGNANCY, UNSPECIFIED GESTATIONAL AGE: ICD-10-CM

## 2018-03-23 LAB — HCG SERPL QL: POSITIVE

## 2018-03-23 PROCEDURE — 84703 CHORIONIC GONADOTROPIN ASSAY: CPT

## 2018-03-23 PROCEDURE — 36415 COLL VENOUS BLD VENIPUNCTURE: CPT

## 2018-03-28 ENCOUNTER — NURSE ONLY (OUTPATIENT)
Dept: OBGYN CLINIC | Facility: CLINIC | Age: 28
End: 2018-03-28

## 2018-03-28 VITALS — BODY MASS INDEX: 22.82 KG/M2 | WEIGHT: 142 LBS | HEIGHT: 66 IN

## 2018-03-28 DIAGNOSIS — Z3A.01 LESS THAN 8 WEEKS GESTATION OF PREGNANCY: Primary | ICD-10-CM

## 2018-03-28 PROCEDURE — 99211 OFF/OP EST MAY X REQ PHY/QHP: CPT | Performed by: OBSTETRICS & GYNECOLOGY

## 2018-03-28 NOTE — PROGRESS NOTES
Pt had OBN PC appt today with no complaints. Normal PN labs plus varicella ordered. Pt advised all labs must be completed and resulted prior to MD appt.   Pt scheduled NPN appt with MD. Papo Echeverria name is Tiffani Ramirez contact #[phone]; race: cauc Glioblastoma    Patient or baby's father had a child with birth defects not listed above No    Previous miscarriages or stillborn Yes  Chemical preg x 1

## 2018-04-15 ENCOUNTER — LAB ENCOUNTER (OUTPATIENT)
Dept: LAB | Facility: HOSPITAL | Age: 28
End: 2018-04-15
Attending: OBSTETRICS & GYNECOLOGY
Payer: COMMERCIAL

## 2018-04-15 DIAGNOSIS — Z3A.01 LESS THAN 8 WEEKS GESTATION OF PREGNANCY: ICD-10-CM

## 2018-04-15 PROCEDURE — 87086 URINE CULTURE/COLONY COUNT: CPT

## 2018-04-15 PROCEDURE — 86901 BLOOD TYPING SEROLOGIC RH(D): CPT

## 2018-04-15 PROCEDURE — 86780 TREPONEMA PALLIDUM: CPT

## 2018-04-15 PROCEDURE — 86900 BLOOD TYPING SEROLOGIC ABO: CPT

## 2018-04-15 PROCEDURE — 85025 COMPLETE CBC W/AUTO DIFF WBC: CPT

## 2018-04-15 PROCEDURE — 87389 HIV-1 AG W/HIV-1&-2 AB AG IA: CPT

## 2018-04-15 PROCEDURE — 86762 RUBELLA ANTIBODY: CPT

## 2018-04-15 PROCEDURE — 36415 COLL VENOUS BLD VENIPUNCTURE: CPT

## 2018-04-15 PROCEDURE — 87340 HEPATITIS B SURFACE AG IA: CPT

## 2018-04-15 PROCEDURE — 86787 VARICELLA-ZOSTER ANTIBODY: CPT

## 2018-04-15 PROCEDURE — 86850 RBC ANTIBODY SCREEN: CPT

## 2018-04-19 ENCOUNTER — HOSPITAL ENCOUNTER (OUTPATIENT)
Dept: ULTRASOUND IMAGING | Facility: HOSPITAL | Age: 28
Discharge: HOME | End: 2018-04-19
Attending: OBSTETRICS & GYNECOLOGY
Payer: COMMERCIAL

## 2018-04-19 ENCOUNTER — INITIAL PRENATAL (OUTPATIENT)
Dept: OBGYN CLINIC | Facility: CLINIC | Age: 28
End: 2018-04-19

## 2018-04-19 VITALS
DIASTOLIC BLOOD PRESSURE: 75 MMHG | WEIGHT: 145.19 LBS | SYSTOLIC BLOOD PRESSURE: 110 MMHG | HEART RATE: 81 BPM | BODY MASS INDEX: 23 KG/M2

## 2018-04-19 DIAGNOSIS — IMO0002 FETAL HEART TONES NOT HEARD: ICD-10-CM

## 2018-04-19 DIAGNOSIS — Z34.91 ENCOUNTER FOR SUPERVISION OF NORMAL PREGNANCY IN FIRST TRIMESTER, UNSPECIFIED GRAVIDITY: Primary | ICD-10-CM

## 2018-04-19 PROCEDURE — 76817 TRANSVAGINAL US OBSTETRIC: CPT | Performed by: OBSTETRICS & GYNECOLOGY

## 2018-04-19 PROCEDURE — 81002 URINALYSIS NONAUTO W/O SCOPE: CPT | Performed by: OBSTETRICS & GYNECOLOGY

## 2018-04-19 PROCEDURE — 76801 OB US < 14 WKS SINGLE FETUS: CPT | Performed by: OBSTETRICS & GYNECOLOGY

## 2018-04-19 NOTE — PROGRESS NOTES
Per JUSTEN V.O. For hold & call d/t no heart tones. Per Sisi Gutiérrez pt schedule at 7pm to drink 32 oz and hold until appt - blue/green parking diagnostic main. JUSTEN informed will let pt know. Awaiting CAP on-call to call back.

## 2018-04-20 ENCOUNTER — TELEPHONE (OUTPATIENT)
Dept: OBGYN CLINIC | Facility: CLINIC | Age: 28
End: 2018-04-20

## 2018-04-20 DIAGNOSIS — O02.1 MISSED ABORTION: Primary | ICD-10-CM

## 2018-04-20 NOTE — PROGRESS NOTES
32 yo  @ 10w6d here for NOB visit. NO complaints. No FHTs noted on BSUS. Sent for Hold and Call [address]. On call doc notified.

## 2018-04-20 NOTE — TELEPHONE ENCOUNTER
Spoke to [facility] regarding ultrasound results. We reviewed expectant management, Cytotec, and suction D&C. Risks and benefits of each approach were reviewed in detail.   The patient thinks she would like to expectantly manage at this time and reassess in 1

## 2018-04-20 NOTE — TELEPHONE ENCOUNTER
Pt decline to speak to the nurse's would like to speak to Dr Antonia Orozco regarding her miscarriage

## 2018-04-21 NOTE — TELEPHONE ENCOUNTER
Pt states she want to do cytotec for SAB. Pt requesting appt on Monday morning. Pt accepted appt with [facility]. Reminded pt on doing baseline Quant on Monday. Pt verbalized understanding.

## 2018-04-23 ENCOUNTER — APPOINTMENT (OUTPATIENT)
Dept: LAB | Facility: HOSPITAL | Age: 28
End: 2018-04-23
Attending: OBSTETRICS & GYNECOLOGY
Payer: COMMERCIAL

## 2018-04-23 ENCOUNTER — HOSPITAL ENCOUNTER (OUTPATIENT)
Facility: HOSPITAL | Age: 28
Setting detail: OBSERVATION
Discharge: HOME | End: 2018-04-24
Attending: OBSTETRICS & GYNECOLOGY | Admitting: OBSTETRICS & GYNECOLOGY
Payer: COMMERCIAL

## 2018-04-23 ENCOUNTER — ANESTHESIA EVENT (OUTPATIENT)
Dept: SURGERY | Facility: HOSPITAL | Age: 28
End: 2018-04-23
Payer: COMMERCIAL

## 2018-04-23 ENCOUNTER — OFFICE VISIT (OUTPATIENT)
Dept: OBGYN CLINIC | Facility: CLINIC | Age: 28
End: 2018-04-23

## 2018-04-23 ENCOUNTER — APPOINTMENT (OUTPATIENT)
Dept: ULTRASOUND IMAGING | Facility: HOSPITAL | Age: 28
End: 2018-04-23
Attending: NURSE PRACTITIONER
Payer: COMMERCIAL

## 2018-04-23 ENCOUNTER — SURGERY (OUTPATIENT)
Age: 28
End: 2018-04-23

## 2018-04-23 ENCOUNTER — ANESTHESIA (OUTPATIENT)
Dept: SURGERY | Facility: HOSPITAL | Age: 28
End: 2018-04-23
Payer: COMMERCIAL

## 2018-04-23 ENCOUNTER — TELEPHONE (OUTPATIENT)
Dept: PEDIATRICS CLINIC | Facility: CLINIC | Age: 28
End: 2018-04-23

## 2018-04-23 VITALS
BODY MASS INDEX: 24 KG/M2 | SYSTOLIC BLOOD PRESSURE: 104 MMHG | HEART RATE: 66 BPM | DIASTOLIC BLOOD PRESSURE: 65 MMHG | WEIGHT: 147 LBS

## 2018-04-23 DIAGNOSIS — O02.1 MISSED ABORTION: Primary | ICD-10-CM

## 2018-04-23 DIAGNOSIS — O02.1 MISSED ABORTION: ICD-10-CM

## 2018-04-23 PROCEDURE — 76801 OB US < 14 WKS SINGLE FETUS: CPT | Performed by: NURSE PRACTITIONER

## 2018-04-23 PROCEDURE — 84702 CHORIONIC GONADOTROPIN TEST: CPT

## 2018-04-23 PROCEDURE — 59820 CARE OF MISCARRIAGE: CPT | Performed by: OBSTETRICS & GYNECOLOGY

## 2018-04-23 PROCEDURE — 59200 INSERT CERVICAL DILATOR: CPT | Performed by: OBSTETRICS & GYNECOLOGY

## 2018-04-23 PROCEDURE — 10D17ZZ EXTRACTION OF PRODUCTS OF CONCEPTION, RETAINED, VIA NATURAL OR ARTIFICIAL OPENING: ICD-10-PCS | Performed by: OBSTETRICS & GYNECOLOGY

## 2018-04-23 PROCEDURE — 36415 COLL VENOUS BLD VENIPUNCTURE: CPT

## 2018-04-23 RX ORDER — NALOXONE HYDROCHLORIDE 0.4 MG/ML
80 INJECTION, SOLUTION INTRAMUSCULAR; INTRAVENOUS; SUBCUTANEOUS AS NEEDED
Status: DISCONTINUED | OUTPATIENT
Start: 2018-04-23 | End: 2018-04-24 | Stop reason: HOSPADM

## 2018-04-23 RX ORDER — SODIUM CHLORIDE 9 MG/ML
1000 INJECTION, SOLUTION INTRAVENOUS ONCE
Status: COMPLETED | OUTPATIENT
Start: 2018-04-23 | End: 2018-04-24

## 2018-04-23 RX ORDER — ONDANSETRON 2 MG/ML
4 INJECTION INTRAMUSCULAR; INTRAVENOUS ONCE AS NEEDED
Status: ACTIVE | OUTPATIENT
Start: 2018-04-23 | End: 2018-04-23

## 2018-04-23 RX ORDER — HALOPERIDOL 5 MG/ML
0.25 INJECTION INTRAMUSCULAR ONCE AS NEEDED
Status: ACTIVE | OUTPATIENT
Start: 2018-04-23 | End: 2018-04-23

## 2018-04-23 RX ORDER — HYDROMORPHONE HYDROCHLORIDE 1 MG/ML
0.2 INJECTION, SOLUTION INTRAMUSCULAR; INTRAVENOUS; SUBCUTANEOUS EVERY 5 MIN PRN
Status: DISCONTINUED | OUTPATIENT
Start: 2018-04-23 | End: 2018-04-24 | Stop reason: HOSPADM

## 2018-04-23 RX ORDER — LIDOCAINE HYDROCHLORIDE 10 MG/ML
INJECTION, SOLUTION EPIDURAL; INFILTRATION; INTRACAUDAL; PERINEURAL AS NEEDED
Status: DISCONTINUED | OUTPATIENT
Start: 2018-04-23 | End: 2018-04-23 | Stop reason: SURG

## 2018-04-23 RX ORDER — HYDROCODONE BITARTRATE AND ACETAMINOPHEN 5; 325 MG/1; MG/1
1 TABLET ORAL AS NEEDED
Status: DISCONTINUED | OUTPATIENT
Start: 2018-04-23 | End: 2018-04-24 | Stop reason: HOSPADM

## 2018-04-23 RX ORDER — SODIUM CHLORIDE, SODIUM LACTATE, POTASSIUM CHLORIDE, CALCIUM CHLORIDE 600; 310; 30; 20 MG/100ML; MG/100ML; MG/100ML; MG/100ML
INJECTION, SOLUTION INTRAVENOUS CONTINUOUS PRN
Status: DISCONTINUED | OUTPATIENT
Start: 2018-04-23 | End: 2018-04-23 | Stop reason: SURG

## 2018-04-23 RX ORDER — ONDANSETRON 2 MG/ML
INJECTION INTRAMUSCULAR; INTRAVENOUS AS NEEDED
Status: DISCONTINUED | OUTPATIENT
Start: 2018-04-23 | End: 2018-04-23 | Stop reason: SURG

## 2018-04-23 RX ORDER — SODIUM CHLORIDE, SODIUM LACTATE, POTASSIUM CHLORIDE, CALCIUM CHLORIDE 600; 310; 30; 20 MG/100ML; MG/100ML; MG/100ML; MG/100ML
INJECTION, SOLUTION INTRAVENOUS CONTINUOUS
Status: DISCONTINUED | OUTPATIENT
Start: 2018-04-23 | End: 2018-04-24 | Stop reason: HOSPADM

## 2018-04-23 RX ORDER — HYDROCODONE BITARTRATE AND ACETAMINOPHEN 5; 325 MG/1; MG/1
2 TABLET ORAL AS NEEDED
Status: DISCONTINUED | OUTPATIENT
Start: 2018-04-23 | End: 2018-04-24 | Stop reason: HOSPADM

## 2018-04-23 RX ORDER — HYDROMORPHONE HYDROCHLORIDE 1 MG/ML
0.4 INJECTION, SOLUTION INTRAMUSCULAR; INTRAVENOUS; SUBCUTANEOUS EVERY 5 MIN PRN
Status: DISCONTINUED | OUTPATIENT
Start: 2018-04-23 | End: 2018-04-24 | Stop reason: HOSPADM

## 2018-04-23 RX ORDER — SODIUM CHLORIDE 9 MG/ML
INJECTION, SOLUTION INTRAVENOUS CONTINUOUS
Status: CANCELLED | OUTPATIENT
Start: 2018-04-23 | End: 2018-04-24

## 2018-04-23 RX ORDER — HYDROMORPHONE HYDROCHLORIDE 1 MG/ML
0.6 INJECTION, SOLUTION INTRAMUSCULAR; INTRAVENOUS; SUBCUTANEOUS EVERY 5 MIN PRN
Status: DISCONTINUED | OUTPATIENT
Start: 2018-04-23 | End: 2018-04-24 | Stop reason: HOSPADM

## 2018-04-23 RX ORDER — DEXAMETHASONE SODIUM PHOSPHATE 4 MG/ML
VIAL (ML) INJECTION AS NEEDED
Status: DISCONTINUED | OUTPATIENT
Start: 2018-04-23 | End: 2018-04-23 | Stop reason: SURG

## 2018-04-23 RX ADMIN — DEXAMETHASONE SODIUM PHOSPHATE 4 MG: 4 MG/ML VIAL (ML) INJECTION at 23:11:00

## 2018-04-23 RX ADMIN — SODIUM CHLORIDE, SODIUM LACTATE, POTASSIUM CHLORIDE, CALCIUM CHLORIDE: 600; 310; 30; 20 INJECTION, SOLUTION INTRAVENOUS at 23:11:00

## 2018-04-23 RX ADMIN — SODIUM CHLORIDE, SODIUM LACTATE, POTASSIUM CHLORIDE, CALCIUM CHLORIDE: 600; 310; 30; 20 INJECTION, SOLUTION INTRAVENOUS at 23:35:00

## 2018-04-23 RX ADMIN — LIDOCAINE HYDROCHLORIDE 50 MG: 10 INJECTION, SOLUTION EPIDURAL; INFILTRATION; INTRACAUDAL; PERINEURAL at 23:11:00

## 2018-04-23 RX ADMIN — ONDANSETRON 4 MG: 2 INJECTION INTRAMUSCULAR; INTRAVENOUS at 23:11:00

## 2018-04-23 NOTE — TELEPHONE ENCOUNTER
Pt reports [facility] placed cytotec this morning for MAB and pt has been bleeding really heavy for the past 2.5 hours. Pt states since 3 pm she is bleeding through pads and clothing. States she has been in the shower the whole time.  When she tries to get out she

## 2018-04-23 NOTE — TELEPHONE ENCOUNTER
The pt states that she is on her way to [address] [facility] [address] [address] [place].

## 2018-04-23 NOTE — ED INITIAL ASSESSMENT (HPI)
Pt states she had a miscarriage at [facility]. Gestation measuring at 8weeks. Cytotak vaginally 4 capsules this AM to induce . Bleeding noted large clots. Pt feels light headed.

## 2018-04-24 VITALS
WEIGHT: 147 LBS | BODY MASS INDEX: 23.63 KG/M2 | HEART RATE: 68 BPM | HEIGHT: 66 IN | DIASTOLIC BLOOD PRESSURE: 57 MMHG | SYSTOLIC BLOOD PRESSURE: 88 MMHG | OXYGEN SATURATION: 98 % | TEMPERATURE: 99 F | RESPIRATION RATE: 18 BRPM

## 2018-04-24 RX ORDER — SODIUM CHLORIDE 0.9 % (FLUSH) 0.9 %
10 SYRINGE (ML) INJECTION AS NEEDED
Status: DISCONTINUED | OUTPATIENT
Start: 2018-04-24 | End: 2018-04-24

## 2018-04-24 RX ORDER — HYDROCODONE BITARTRATE AND ACETAMINOPHEN 5; 325 MG/1; MG/1
1-2 TABLET ORAL EVERY 6 HOURS PRN
Status: DISCONTINUED | OUTPATIENT
Start: 2018-04-24 | End: 2018-04-24

## 2018-04-24 RX ORDER — ONDANSETRON 2 MG/ML
4 INJECTION INTRAMUSCULAR; INTRAVENOUS EVERY 8 HOURS PRN
Status: DISCONTINUED | OUTPATIENT
Start: 2018-04-24 | End: 2018-04-24

## 2018-04-24 RX ORDER — ONDANSETRON 4 MG/1
4 TABLET, FILM COATED ORAL EVERY 8 HOURS PRN
Status: DISCONTINUED | OUTPATIENT
Start: 2018-04-24 | End: 2018-04-24

## 2018-04-24 RX ORDER — DEXTROSE AND SODIUM CHLORIDE 5; .45 G/100ML; G/100ML
INJECTION, SOLUTION INTRAVENOUS CONTINUOUS
Status: DISCONTINUED | OUTPATIENT
Start: 2018-04-24 | End: 2018-04-24

## 2018-04-24 RX ORDER — IBUPROFEN 600 MG/1
600 TABLET ORAL EVERY 6 HOURS
Status: DISCONTINUED | OUTPATIENT
Start: 2018-04-24 | End: 2018-04-24

## 2018-04-24 NOTE — PROGRESS NOTES
GYN Post-Op Note  Date:  2018, 8:33 AM    Subjective:   33year old  s/p suction dilation and curettage for incomplete AB. Pt doing well this morning. Denies cramping.  She had left arm pain overnight in her axilla and pectoralis region which ha

## 2018-04-24 NOTE — ED NOTES
Patient off floor to [address] [facility] [facility]

## 2018-04-24 NOTE — ANESTHESIA POSTPROCEDURE EVALUATION
Patient: Rosina Cui    Procedure Summary     Date:  04/23/18 Room / Location:  [address] [place] [address] MAIN OR 04 / [address] [place] [address] MAIN OR    Anesthesia Start:  2308 Anesthesia Stop:      Procedure:  DILATION & CURETTAGE SUCTION (N/A ) Diagnosis:       Retained products of conc

## 2018-04-24 NOTE — OPERATIVE REPORT
Suction Dilation and Curettage Operative Report:      Preoperative Diagnosis: 8 week intrauterine pregnancy with incomplete   Postoperative Diagnosis: Same    Procedure: Suction Dilation and Curettage     Surgeon: Sonia Carranza MD  Anesthesia:Gene

## 2018-04-24 NOTE — ANESTHESIA PREPROCEDURE EVALUATION
Anesthesia PreOp Note    HPI:     Candida Walton is a 33year old female who presents for preoperative consultation requested by: Lee Lyn MD    Date of Surgery: 4/23/2018    Procedure(s):  DILATION & CURETTAGE SUCTION  Indication: Retained p 73.7 (L) 04/23/2018   MCH 23.9 (L) 04/23/2018   MCHC 32.4 04/23/2018   RDW 14.4 04/23/2018    04/23/2018   MPV 6.7 (L) 04/23/2018   PREGS Positive (A) 03/23/2018   HCGQN 20,699.0 04/23/2018             Vital Signs: Body mass index is 23.73 kg/m².

## 2018-04-24 NOTE — ED PROVIDER NOTES
Patient Seen in: [facility] AND [facility] Emergency Department    History   Patient presents with:  Pregnancy Issues (gynecologic)    Stated Complaint: Bleeding Vaginal     Patient presents into the emergency room for evaluation of vaginal bleeding.   Patient  92  Resp: 18  Temp: 99.3 °F (37.4 °C)  Temp src: Temporal  SpO2: 100 %  O2 Device: None (Room air)    Current:/58   Pulse 76   Temp 99.3 °F (37.4 °C) (Temporal)   Resp 18   Ht 167.6 cm (5' 6\")   Wt 66.7 kg   LMP 02/02/2018 (Exact Date)   SpO2 99% Abnormal            Final result                 Please view results for these tests on the individual orders. TYPE AND SCREEN    Narrative: The following orders were created for panel order TYPE AND SCREEN.   Procedure patient's chief complaint clinical exam lab results and ultrasound findings. She will be down to see and evaluate patient.   Hemoglobin was 8.3, patient was typed and screened, and typed and crossed  Admission disposition: 4/23/2018 10:45 PM       10:28 PM

## 2018-04-24 NOTE — H&P
[address]  Patient Status:  Emergency    4/15/1990 MRN [id number]   Location [address] Attending Chikis Vera MD   Hosp Day # 0 PCP Tammi Oakes MD hospital admission)  Allergies:   Adhesive Tape               OBJECTIVE:    Temp:  [99.3 °F (37.4 °C)] 99.3 °F (37.4 °C)  Pulse:  [] 76  Resp:  [18] 18  BP: (104-114)/(55-73) 104/58    General: Alert and Oriented  Abdomen: Soft, non-tender, non-diste

## 2018-04-24 NOTE — PLAN OF CARE
HEMATOLOGIC - ADULT    • Maintains hematologic stability Progressing    • Free from bleeding injury Progressing        PAIN - ADULT    • Verbalizes/displays adequate comfort level or patient's stated pain goal Progressing        Patient/Family Goals    • P

## 2018-05-03 RX ORDER — MISOPROSTOL 200 UG/1
800 TABLET ORAL ONCE
Status: DISCONTINUED | OUTPATIENT
Start: 2018-05-03 | End: 2019-01-10

## 2018-05-03 NOTE — PROGRESS NOTES
HPI:    Patient ID: Shade Corea is a 33year old female. HPI   with missed Ab by US and here today for Cytotec. Charlene Escobedo at visit. I discussed options for treatment and method / expectations for Cytotec. Questions answered.      Review of Sys

## 2018-05-03 NOTE — PATIENT INSTRUCTIONS
· Please call with any questions or concerns. · Please call with heavy vaginal bleeding that is saturating more than a pad an hour for two hours or if you have lightheadedness/dizzines, shortness or breath or chest pain.    · Please call with severe marcy

## 2018-05-04 NOTE — TELEPHONE ENCOUNTER
Called pt to inform her I placed an order for [facility]. She should be receiving a call within 48 hours. Asked pt if she has thoughts of hurting herself or others, she does not. Pt states she is able to care for herself and complete her daily tasks.  Pt will call

## 2018-05-04 NOTE — TELEPHONE ENCOUNTER
Pt just miscarried, would like a referral for mental health, per pt is not urgent, just having a soares time coping

## 2018-05-08 ENCOUNTER — APPOINTMENT (OUTPATIENT)
Dept: LAB | Facility: HOSPITAL | Age: 28
End: 2018-05-08
Attending: OBSTETRICS & GYNECOLOGY
Payer: COMMERCIAL

## 2018-05-08 ENCOUNTER — OFFICE VISIT (OUTPATIENT)
Dept: OBGYN CLINIC | Facility: CLINIC | Age: 28
End: 2018-05-08

## 2018-05-08 ENCOUNTER — TELEPHONE (OUTPATIENT)
Dept: OBGYN UNIT | Facility: HOSPITAL | Age: 28
End: 2018-05-08

## 2018-05-08 VITALS
SYSTOLIC BLOOD PRESSURE: 106 MMHG | BODY MASS INDEX: 23 KG/M2 | HEART RATE: 65 BPM | WEIGHT: 145 LBS | DIASTOLIC BLOOD PRESSURE: 69 MMHG

## 2018-05-08 DIAGNOSIS — O03.4 INCOMPLETE SPONTANEOUS ABORTION: ICD-10-CM

## 2018-05-08 DIAGNOSIS — O03.4 INCOMPLETE SPONTANEOUS ABORTION: Primary | ICD-10-CM

## 2018-05-08 DIAGNOSIS — F41.9 ANXIETY: ICD-10-CM

## 2018-05-08 PROCEDURE — 36415 COLL VENOUS BLD VENIPUNCTURE: CPT

## 2018-05-08 PROCEDURE — 99214 OFFICE O/P EST MOD 30 MIN: CPT | Performed by: OBSTETRICS & GYNECOLOGY

## 2018-05-08 PROCEDURE — 84702 CHORIONIC GONADOTROPIN TEST: CPT

## 2018-05-08 RX ORDER — ALPRAZOLAM 0.5 MG/1
0.5 TABLET ORAL NIGHTLY PRN
Qty: 20 TABLET | Refills: 0 | Status: SHIPPED | OUTPATIENT
Start: 2018-05-08 | End: 2019-01-10

## 2018-05-08 NOTE — PROGRESS NOTES
Ronda Martinez is a 33year old female  Patient's last menstrual period was 2018 (exact date). Patient presents with:  Gyn Problem: D&C F/UP  Patient presents today after D&C for incomplete AB. Pt states she is having anxiety.  She has bad MEDICATIONS:    Current Outpatient Prescriptions:   •  ALPRAZolam (XANAX) 0.5 MG Oral Tab, Take 1 tablet (0.5 mg total) by mouth nightly as needed for Sleep., Disp: 20 tablet, Rfl: 0    ALLERGIES:    Adhesive Tape                 Review of Systems: Plan:  1. Xanax for anxiety  2. Already referred to counseling  3. Reviewed go to ER for SI/HI  4. . Reviewed unknown when next menses will be- recommend condoms if sexually active as she does not want to conceive  5.  Reviewed need for HCG as no jakob

## 2018-05-09 ENCOUNTER — TELEPHONE (OUTPATIENT)
Dept: OBGYN CLINIC | Facility: CLINIC | Age: 28
End: 2018-05-09

## 2018-05-09 DIAGNOSIS — O03.9 SAB (SPONTANEOUS ABORTION): Primary | ICD-10-CM

## 2018-05-09 NOTE — TELEPHONE ENCOUNTER
----- Message from Mckenna Chester MD sent at 5/9/2018  9:32 AM CDT -----  Please let patient know that her HCG dropped dramatically. She does have some residual hormone level left.  She will need repeat quant in one month to make sure it is zero

## 2018-05-15 ENCOUNTER — TELEPHONE (OUTPATIENT)
Dept: OBGYN CLINIC | Facility: CLINIC | Age: 28
End: 2018-05-15

## 2018-05-15 NOTE — TELEPHONE ENCOUNTER
----- Message from Bin Moreland RN sent at 5/7/2018  2:36 PM CDT -----  Call pt for f/u on General acute hospital services. She was contacted by General acute hospital and given #s to several counselors.

## 2018-05-15 NOTE — TELEPHONE ENCOUNTER
Called pt to be sure she was able to get in touch with a therapist. She states she was, and she just made an appt. Encouraged her to call with any additional questions. Pt verbalized understanding.

## 2018-06-08 ENCOUNTER — TELEPHONE (OUTPATIENT)
Dept: OBGYN CLINIC | Facility: CLINIC | Age: 28
End: 2018-06-08

## 2018-06-08 NOTE — TELEPHONE ENCOUNTER
LEFT MESSAGE THAT I CANNOT FIND ANY EVIDENCE THAT WE CALLED HER IN THE PAST MONTH BUT SHE SHOULD CALL US BACK IF SHE HAS ANY QUESTIONS OR CONCERNS.

## 2018-07-18 NOTE — TELEPHONE ENCOUNTER
Called pt and reminded her to complete hcg quant. Pt stated she totally forgot and will repeat ASAP.

## 2018-07-28 ENCOUNTER — APPOINTMENT (OUTPATIENT)
Dept: LAB | Age: 28
End: 2018-07-28
Attending: OBSTETRICS & GYNECOLOGY
Payer: COMMERCIAL

## 2018-07-28 DIAGNOSIS — O03.9 SAB (SPONTANEOUS ABORTION): ICD-10-CM

## 2018-07-28 LAB — B-HCG SERPL-ACNC: <0.6 MIU/ML

## 2018-07-28 PROCEDURE — 36415 COLL VENOUS BLD VENIPUNCTURE: CPT

## 2018-07-28 PROCEDURE — 84702 CHORIONIC GONADOTROPIN TEST: CPT

## 2018-08-02 ENCOUNTER — TELEPHONE (OUTPATIENT)
Dept: OBGYN CLINIC | Facility: CLINIC | Age: 28
End: 2018-08-02

## 2018-08-02 NOTE — TELEPHONE ENCOUNTER
----- Message from Antione Campbell MD sent at 7/31/2018 10:50 AM CDT -----  Please let patient know that her Lalo Bowman is negative. Please make sure she is doing ok.

## 2018-11-21 ENCOUNTER — OFFICE VISIT (OUTPATIENT)
Dept: OBGYN CLINIC | Facility: CLINIC | Age: 28
End: 2018-11-21

## 2018-11-21 VITALS
SYSTOLIC BLOOD PRESSURE: 104 MMHG | BODY MASS INDEX: 22 KG/M2 | HEART RATE: 66 BPM | WEIGHT: 138 LBS | DIASTOLIC BLOOD PRESSURE: 68 MMHG

## 2018-11-21 DIAGNOSIS — Z01.419 ENCOUNTER FOR GYNECOLOGICAL EXAMINATION WITHOUT ABNORMAL FINDING: Primary | ICD-10-CM

## 2018-11-21 PROCEDURE — 99395 PREV VISIT EST AGE 18-39: CPT | Performed by: OBSTETRICS & GYNECOLOGY

## 2018-11-21 NOTE — PROGRESS NOTES
Well Woman Exam    HPI:  The patient is a 26yo female who presents today for annual exam. She has no complaints. She is feeling well and mood is stable. She is ready to try and conceive again after two miscarriages. Pt taking PNV.      Reviewed medical and file      FAMILY HISTORY:  Family History   Problem Relation Age of Onset   • Cancer Father    • Cancer Maternal Grandmother          of leukemia   • Diabetes Maternal Grandfather    • Cancer Paternal Grandfather        MEDICATIONS:    Current Outpatie normal in size, contour, position, mobility, without tenderness  Adnexa: normal without masses or tenderness  Perineum: normal    Assessment/Plan:  1. WWE:   1. Reviewed ASCCP guidelines with the patient   2. Pap 2017 negative- repeat 2020  2.  Preconceptio

## 2018-12-11 ENCOUNTER — TELEPHONE (OUTPATIENT)
Dept: OBGYN CLINIC | Facility: CLINIC | Age: 28
End: 2018-12-11

## 2018-12-11 DIAGNOSIS — O09.299 HISTORY OF MISCARRIAGE, CURRENTLY PREGNANT: Primary | ICD-10-CM

## 2018-12-12 NOTE — TELEPHONE ENCOUNTER
Pt reports +HPT and LMP of 11/8/18. Pt reports regular cycles. Pt is on pnv with [facility]. Pt delivered with our practice in 2017 and is familiar with rotating through all MDs.  Pt accepted [facility]BN PC appt on 12/31 at 11am. Pt informed hcg quant x 2 at 48 hours apar

## 2018-12-13 ENCOUNTER — APPOINTMENT (OUTPATIENT)
Dept: LAB | Age: 28
End: 2018-12-13
Attending: OBSTETRICS & GYNECOLOGY
Payer: COMMERCIAL

## 2018-12-13 DIAGNOSIS — O09.299 HISTORY OF MISCARRIAGE, CURRENTLY PREGNANT: ICD-10-CM

## 2018-12-15 ENCOUNTER — APPOINTMENT (OUTPATIENT)
Dept: LAB | Age: 28
End: 2018-12-15
Attending: OBSTETRICS & GYNECOLOGY
Payer: COMMERCIAL

## 2018-12-15 DIAGNOSIS — O09.299 HISTORY OF MISCARRIAGE, CURRENTLY PREGNANT: ICD-10-CM

## 2018-12-19 ENCOUNTER — TELEPHONE (OUTPATIENT)
Dept: OBGYN CLINIC | Facility: CLINIC | Age: 28
End: 2018-12-19

## 2018-12-19 DIAGNOSIS — O09.299 HISTORY OF SPONTANEOUS ABORTION, CURRENTLY PREGNANT: Primary | ICD-10-CM

## 2018-12-19 NOTE — TELEPHONE ENCOUNTER
Vaginal progesterone. Can do Prometrium vaginally as well if not covered by insurance. And no further Quants. Plan for US next week.

## 2018-12-19 NOTE — TELEPHONE ENCOUNTER
IS THERE A VAGINAL PROMETRIUM OR DID YOU MEAN PROGESTERONE SUPPOSITORIES? PT HAD 2 QUANTS ALREADY, DO YOU WANT 1 MORE?  LAST QUANT WAS 12-15-18.

## 2018-12-19 NOTE — TELEPHONE ENCOUNTER
100MG OR 200MG? COMPOUNDED PROGESTERONE SUPPOSITORIES COME 200MG. THERE IS A PROMETRIUM DOSE OF 100MG ONCE DAILY, TWICE DAILY OR 200MG ONCE DAILY?

## 2018-12-20 NOTE — TELEPHONE ENCOUNTER
Informed pt of results and KCB recs below. Vaginal suppositories ordered and sent to pt pharmacy. U/S ordered and phone number provided to schedule U/S. Pt verbalized understanding.

## 2018-12-24 ENCOUNTER — TELEPHONE (OUTPATIENT)
Dept: OBGYN CLINIC | Facility: CLINIC | Age: 28
End: 2018-12-24

## 2018-12-24 ENCOUNTER — HOSPITAL ENCOUNTER (OUTPATIENT)
Dept: ULTRASOUND IMAGING | Age: 28
Discharge: HOME | End: 2018-12-24
Attending: OBSTETRICS & GYNECOLOGY
Payer: COMMERCIAL

## 2018-12-24 DIAGNOSIS — O09.299 HISTORY OF SPONTANEOUS ABORTION, CURRENTLY PREGNANT: ICD-10-CM

## 2018-12-24 PROCEDURE — 76801 OB US < 14 WKS SINGLE FETUS: CPT | Performed by: OBSTETRICS & GYNECOLOGY

## 2018-12-24 PROCEDURE — 76817 TRANSVAGINAL US OBSTETRIC: CPT | Performed by: OBSTETRICS & GYNECOLOGY

## 2018-12-24 NOTE — TELEPHONE ENCOUNTER
C/O NAUSEA AT LEAST 60% OF THE DAY OR MORE AND IS GETTING WORSE EACH DAY. DISCUSSED TAKING VIT B6 25MG 4 TIMES DAILY AND TAKING 1/2 TAB OF UNISOM AT BEDTIME RATHER THAN RX RIGHT NOW.   EXPLAINED VIT B6 BUILDS UP SO MAY GET BETTER RELIEF AFTER TAKING FOR A

## 2018-12-26 ENCOUNTER — TELEPHONE (OUTPATIENT)
Dept: OBGYN CLINIC | Facility: CLINIC | Age: 28
End: 2018-12-26

## 2018-12-26 NOTE — TELEPHONE ENCOUNTER
Pa sent a requst for prior authorization needed for progesterone 200 mg vag supp. Talked to to [facility] for prior authorization, [employer]. He will process it and send us a confirmation within 72 hours.

## 2018-12-26 NOTE — TELEPHONE ENCOUNTER
Request for Progesterone 200mg supp denied. Denial placed on [facility]'s desk for review.

## 2018-12-31 ENCOUNTER — NURSE ONLY (OUTPATIENT)
Dept: OBGYN CLINIC | Facility: CLINIC | Age: 28
End: 2018-12-31

## 2018-12-31 DIAGNOSIS — Z34.81 ENCOUNTER FOR SUPERVISION OF OTHER NORMAL PREGNANCY IN FIRST TRIMESTER: Primary | ICD-10-CM

## 2018-12-31 RX ORDER — PRENATAL VIT/IRON FUM/FOLIC AC 27MG-0.8MG
1 TABLET ORAL DAILY
COMMUNITY

## 2018-12-31 RX ORDER — DIPHENHYDRAMINE HYDROCHLORIDE 25 MG/1
50 CAPSULE ORAL 2 TIMES DAILY
COMMUNITY
End: 2019-01-10

## 2018-12-31 NOTE — PROGRESS NOTES
Pt seen for OBN PC appt today with complaints of nausea. Pt was told to take 25mg of Vit D 4 times a day and 1/2 tab of unisom. Pt states this helps a little. Normal PN labs ordered. Pt advised all labs must be completed and resulted prior to MD appt. new tattoos since last pregnancy   Live with someone or Exposed to TB No    Rash or viral illness since LMP  No    Varicella No    Recent Travel (or planned travel) to [facility] area for self and or partner No    Pets Yes Cats, pt does not change tee litter

## 2019-01-02 NOTE — TELEPHONE ENCOUNTER
LMTCB.  NEED TO NOTIFY PT THAT INSURANCE WILL NOT COVER PROGESTERONE VAGINAL SUPPOSITORIES.   PT CAN CHECK WITH HER INSURANCE TO SEE IF THEY WILL COVER PROMETRIUM OR CRINONE.  (FORM IS IN FRONT OFFICE FAX TRAY)

## 2019-01-02 NOTE — TELEPHONE ENCOUNTER
NOTIFIED PT HER INSURANCE WILL NOT COVER FIRST-PROGESTERONE VGS 200MG SUPPOSITORIES. STATES SHE HAS NOT STARTED RX BECAUSE IT WILL COST HER $230/MONTH. ADVISED PT TO CALL HER INSURANCE TO FIND OUT IF THEY WILL COVER CRINONE (0.8%) OR PROMETRIUM.   PT IS A

## 2019-01-07 ENCOUNTER — LAB ENCOUNTER (OUTPATIENT)
Dept: LAB | Age: 29
End: 2019-01-07
Attending: OBSTETRICS & GYNECOLOGY
Payer: COMMERCIAL

## 2019-01-07 DIAGNOSIS — Z34.81 ENCOUNTER FOR SUPERVISION OF OTHER NORMAL PREGNANCY IN FIRST TRIMESTER: ICD-10-CM

## 2019-01-07 LAB
ANTIBODY SCREEN: NEGATIVE
RH BLOOD TYPE: POSITIVE
RUBV IGG SER-ACNC: 39.7 IU/ML

## 2019-01-07 PROCEDURE — 86762 RUBELLA ANTIBODY: CPT

## 2019-01-07 PROCEDURE — 86780 TREPONEMA PALLIDUM: CPT

## 2019-01-07 PROCEDURE — 86901 BLOOD TYPING SEROLOGIC RH(D): CPT

## 2019-01-07 PROCEDURE — 87086 URINE CULTURE/COLONY COUNT: CPT

## 2019-01-07 PROCEDURE — 85060 BLOOD SMEAR INTERPRETATION: CPT

## 2019-01-07 PROCEDURE — 86850 RBC ANTIBODY SCREEN: CPT

## 2019-01-07 PROCEDURE — 87340 HEPATITIS B SURFACE AG IA: CPT

## 2019-01-07 PROCEDURE — 87389 HIV-1 AG W/HIV-1&-2 AB AG IA: CPT

## 2019-01-07 PROCEDURE — 86900 BLOOD TYPING SEROLOGIC ABO: CPT

## 2019-01-07 PROCEDURE — 36415 COLL VENOUS BLD VENIPUNCTURE: CPT

## 2019-01-07 PROCEDURE — 85025 COMPLETE CBC W/AUTO DIFF WBC: CPT

## 2019-01-08 LAB
BASOPHILS # BLD: 0.1 K/UL (ref 0–0.2)
BASOPHILS NFR BLD: 1 %
EOSINOPHIL # BLD: 0.1 K/UL (ref 0–0.7)
EOSINOPHIL NFR BLD: 1 %
ERYTHROCYTE [DISTWIDTH] IN BLOOD BY AUTOMATED COUNT: 16.9 % (ref 11–15)
HBV SURFACE AG SERPL QL IA: NONREACTIVE
HCT VFR BLD AUTO: 29.9 % (ref 35–48)
HGB BLD-MCNC: 9.6 G/DL (ref 12–16)
HIV1+2 AB SERPL QL IA: NONREACTIVE
LYMPHOCYTES # BLD: 2.8 K/UL (ref 1–4)
LYMPHOCYTES NFR BLD: 24 %
MCH RBC QN AUTO: 21.9 PG (ref 27–32)
MCHC RBC AUTO-ENTMCNC: 32 G/DL (ref 32–37)
MCV RBC AUTO: 68.5 FL (ref 80–100)
MONOCYTES # BLD: 0.8 K/UL (ref 0–1)
MONOCYTES NFR BLD: 7 %
NEUTROPHILS # BLD AUTO: 8 K/UL (ref 1.8–7.7)
NEUTROPHILS NFR BLD: 68 %
PLATELET # BLD AUTO: 323 K/UL (ref 140–400)
PMV BLD AUTO: 7.9 FL (ref 7.4–10.3)
RBC # BLD AUTO: 4.37 M/UL (ref 3.7–5.4)
WBC # BLD AUTO: 11.8 K/UL (ref 4–11)

## 2019-01-09 ENCOUNTER — TELEPHONE (OUTPATIENT)
Dept: OBGYN CLINIC | Facility: CLINIC | Age: 29
End: 2019-01-09

## 2019-01-09 LAB — T PALLIDUM AB SER QL: NEGATIVE

## 2019-01-09 RX ORDER — ONDANSETRON 4 MG/1
4 TABLET, ORALLY DISINTEGRATING ORAL EVERY 8 HOURS PRN
Qty: 30 TABLET | Refills: 0 | Status: SHIPPED | OUTPATIENT
Start: 2019-01-09 | End: 2019-02-11

## 2019-01-09 NOTE — TELEPHONE ENCOUNTER
8w6d Pt states she has vomited 6 times this morning and has had diarrhea 4 times today. Pt states she always has \"bad morning sickness with my pregnancies\". Pt states she has tried the Vitamin B6 and Unisom combo with no relief.  Pt states she is able to

## 2019-01-09 NOTE — TELEPHONE ENCOUNTER
See 1/9/19 encounter. Pt contacted insurance company and was told Crinone and Prometrium are covered under insurance. Per KCB ok to order crinone or Prometrium (vagianlly). Crinone ordered and sent to pt pharmacy.

## 2019-01-09 NOTE — TELEPHONE ENCOUNTER
Revived below with [facility] (on-call). V/O from [address] pt to have Zofran OTD 4mg q8hr PRN. Rx ordered and sent to pt pharmacy. Advised pt if she is unable to tolerate fluids for 6-8 hours or has dizziness pt needs to go to ED for IV hydration.  Pt very appreciative

## 2019-01-10 ENCOUNTER — INITIAL PRENATAL (OUTPATIENT)
Dept: OBGYN CLINIC | Facility: CLINIC | Age: 29
End: 2019-01-10

## 2019-01-10 ENCOUNTER — TELEPHONE (OUTPATIENT)
Dept: OBGYN CLINIC | Facility: CLINIC | Age: 29
End: 2019-01-10

## 2019-01-10 ENCOUNTER — APPOINTMENT (OUTPATIENT)
Dept: LAB | Facility: HOSPITAL | Age: 29
End: 2019-01-10
Attending: OBSTETRICS & GYNECOLOGY
Payer: COMMERCIAL

## 2019-01-10 VITALS
HEART RATE: 67 BPM | SYSTOLIC BLOOD PRESSURE: 107 MMHG | WEIGHT: 142 LBS | BODY MASS INDEX: 23 KG/M2 | DIASTOLIC BLOOD PRESSURE: 71 MMHG

## 2019-01-10 DIAGNOSIS — D50.9 MICROCYTIC ANEMIA: ICD-10-CM

## 2019-01-10 DIAGNOSIS — O99.011 ANEMIA DURING PREGNANCY IN FIRST TRIMESTER: ICD-10-CM

## 2019-01-10 DIAGNOSIS — D50.9 MICROCYTIC ANEMIA: Primary | ICD-10-CM

## 2019-01-10 DIAGNOSIS — Z34.91 ENCOUNTER FOR SUPERVISION OF NORMAL PREGNANCY IN FIRST TRIMESTER, UNSPECIFIED GRAVIDITY: Primary | ICD-10-CM

## 2019-01-10 LAB
APPEARANCE: CLEAR
FERRITIN SERPL IA-MCNC: 7 NG/ML (ref 11–307)
IRON SATN MFR SERPL: 4 % (ref 15–50)
IRON SERPL-MCNC: 23 MCG/DL (ref 28–170)
MULTISTIX LOT#: NORMAL NUMERIC
SPECIFIC GRAVITY: 1.01 (ref 1–1.03)
TIBC SERPL-MCNC: 546 MCG/DL (ref 228–428)
TRANSFERRIN SERPL-MCNC: 414 MG/DL (ref 192–382)
URINE-COLOR: YELLOW

## 2019-01-10 PROCEDURE — 82728 ASSAY OF FERRITIN: CPT

## 2019-01-10 PROCEDURE — 83020 HEMOGLOBIN ELECTROPHORESIS: CPT

## 2019-01-10 PROCEDURE — 83540 ASSAY OF IRON: CPT

## 2019-01-10 PROCEDURE — 84466 ASSAY OF TRANSFERRIN: CPT

## 2019-01-10 PROCEDURE — 81002 URINALYSIS NONAUTO W/O SCOPE: CPT | Performed by: OBSTETRICS & GYNECOLOGY

## 2019-01-10 PROCEDURE — 36415 COLL VENOUS BLD VENIPUNCTURE: CPT

## 2019-01-10 PROCEDURE — 90471 IMMUNIZATION ADMIN: CPT | Performed by: OBSTETRICS & GYNECOLOGY

## 2019-01-10 PROCEDURE — 83021 HEMOGLOBIN CHROMOTOGRAPHY: CPT

## 2019-01-10 PROCEDURE — 90686 IIV4 VACC NO PRSV 0.5 ML IM: CPT | Performed by: OBSTETRICS & GYNECOLOGY

## 2019-01-10 NOTE — PROGRESS NOTES
Pt given flu vaccine in the left deltoid. Pt tolerated well. Pt signed consent.       LOT:  [id number]  Exp:   6-30-19  [address]:  46380-923-00

## 2019-01-10 NOTE — PROGRESS NOTES
Reviewed ob u/s. N/v better with zofran. Was prescribed crinone-will start today. Last pap 8/2017. Gc/chlamydia/trichomonas. Bedside u/s--+FHT, c/w dates. Declined FTS. Wants flu shot. RTC 4 wk.

## 2019-01-10 NOTE — TELEPHONE ENCOUNTER
----- Message from Lisandro Hussein DO sent at 1/10/2019  1:06 PM CST -----  Patients hgb and iron studies c/w iron deficient anemia.   Needs to start iron daily

## 2019-01-10 NOTE — TELEPHONE ENCOUNTER
Informed pt of JUSTEN's recs and needing to start Iron. Pt states that she talked to Viraj Brand 81Sameera today at her appt. She informed JLK of her nausea and that JLK stated that she could start Iron at 12 weeks. Informed pt that I would make a note of it.

## 2019-01-11 LAB
C TRACH DNA SPEC QL NAA+PROBE: NEGATIVE
N GONORRHOEA DNA SPEC QL NAA+PROBE: NEGATIVE
T VAGINALIS RRNA SPEC QL NAA+PROBE: NEGATIVE

## 2019-01-16 LAB
HGB A2 MFR BLD: 2.4 % (ref 1.5–3.5)
HGB PNL BLD ELPH: 97.6 % (ref 95.5–100)

## 2019-01-18 RX ORDER — ONDANSETRON 4 MG/1
4 TABLET, ORALLY DISINTEGRATING ORAL EVERY 8 HOURS PRN
Qty: 30 TABLET | Refills: 0 | OUTPATIENT
Start: 2019-01-18

## 2019-01-22 RX ORDER — ONDANSETRON 4 MG/1
4 TABLET, ORALLY DISINTEGRATING ORAL EVERY 8 HOURS PRN
Qty: 30 TABLET | Refills: 0 | OUTPATIENT
Start: 2019-01-22

## 2019-01-22 NOTE — TELEPHONE ENCOUNTER
Pt states she took the last dose of Zofran on Friday 1/18 and she has been doing better than expected without it. Pt reports she has vomited a couple times and does not need the refill at this time.  Advised pt to call us back if nausea increases and she is

## 2019-02-11 ENCOUNTER — ROUTINE PRENATAL (OUTPATIENT)
Dept: OBGYN CLINIC | Facility: CLINIC | Age: 29
End: 2019-02-11

## 2019-02-11 VITALS
DIASTOLIC BLOOD PRESSURE: 75 MMHG | SYSTOLIC BLOOD PRESSURE: 115 MMHG | HEART RATE: 69 BPM | BODY MASS INDEX: 24 KG/M2 | WEIGHT: 150 LBS

## 2019-02-11 DIAGNOSIS — Z34.91 ENCOUNTER FOR SUPERVISION OF NORMAL PREGNANCY IN FIRST TRIMESTER, UNSPECIFIED GRAVIDITY: Primary | ICD-10-CM

## 2019-02-11 LAB
MULTISTIX LOT#: NORMAL NUMERIC
PH, URINE: 6 (ref 4.5–8)
SPECIFIC GRAVITY: 1 (ref 1–1.03)
UROBILINOGEN,SEMI-QN: 0.2 MG/DL (ref 0–1.9)

## 2019-02-11 PROCEDURE — 81002 URINALYSIS NONAUTO W/O SCOPE: CPT | Performed by: OBSTETRICS & GYNECOLOGY

## 2019-03-11 ENCOUNTER — ROUTINE PRENATAL (OUTPATIENT)
Dept: OBGYN CLINIC | Facility: CLINIC | Age: 29
End: 2019-03-11

## 2019-03-11 VITALS
DIASTOLIC BLOOD PRESSURE: 68 MMHG | WEIGHT: 157 LBS | SYSTOLIC BLOOD PRESSURE: 107 MMHG | HEART RATE: 71 BPM | HEIGHT: 66 IN | BODY MASS INDEX: 25.23 KG/M2

## 2019-03-11 DIAGNOSIS — Z34.92 ENCOUNTER FOR SUPERVISION OF NORMAL PREGNANCY IN SECOND TRIMESTER, UNSPECIFIED GRAVIDITY: Primary | ICD-10-CM

## 2019-03-11 PROBLEM — Z34.90 PREGNANCY: Status: RESOLVED | Noted: 2017-04-02 | Resolved: 2019-03-11

## 2019-03-11 LAB
MULTISTIX LOT#: NORMAL NUMERIC
PH, URINE: 6 (ref 4.5–8)
SPECIFIC GRAVITY: 1.01 (ref 1–1.03)

## 2019-03-11 PROCEDURE — 81002 URINALYSIS NONAUTO W/O SCOPE: CPT | Performed by: OBSTETRICS & GYNECOLOGY

## 2019-03-13 ENCOUNTER — TELEPHONE (OUTPATIENT)
Dept: OBGYN CLINIC | Facility: CLINIC | Age: 29
End: 2019-03-13

## 2019-03-13 DIAGNOSIS — Z34.82 ENCOUNTER FOR SUPERVISION OF OTHER NORMAL PREGNANCY IN SECOND TRIMESTER: Primary | ICD-10-CM

## 2019-03-13 NOTE — TELEPHONE ENCOUNTER
Pt saw Davida Morgan on 3/11. Message to Davida Morgan if OK to place order for OB US 2nd/3rd trimester?

## 2019-03-26 ENCOUNTER — HOSPITAL ENCOUNTER (OUTPATIENT)
Dept: ULTRASOUND IMAGING | Facility: HOSPITAL | Age: 29
Discharge: HOME | End: 2019-03-26
Attending: OBSTETRICS & GYNECOLOGY
Payer: COMMERCIAL

## 2019-03-26 DIAGNOSIS — Z34.82 ENCOUNTER FOR SUPERVISION OF OTHER NORMAL PREGNANCY IN SECOND TRIMESTER: ICD-10-CM

## 2019-03-26 PROCEDURE — 76805 OB US >/= 14 WKS SNGL FETUS: CPT | Performed by: OBSTETRICS & GYNECOLOGY

## 2019-04-10 ENCOUNTER — ROUTINE PRENATAL (OUTPATIENT)
Dept: OBGYN CLINIC | Facility: CLINIC | Age: 29
End: 2019-04-10

## 2019-04-10 VITALS
DIASTOLIC BLOOD PRESSURE: 67 MMHG | BODY MASS INDEX: 26 KG/M2 | HEART RATE: 66 BPM | SYSTOLIC BLOOD PRESSURE: 105 MMHG | WEIGHT: 164 LBS

## 2019-04-10 DIAGNOSIS — Z34.92 ENCOUNTER FOR SUPERVISION OF NORMAL PREGNANCY IN SECOND TRIMESTER, UNSPECIFIED GRAVIDITY: Primary | ICD-10-CM

## 2019-04-10 PROCEDURE — 81002 URINALYSIS NONAUTO W/O SCOPE: CPT | Performed by: OBSTETRICS & GYNECOLOGY

## 2019-04-12 PROBLEM — O99.340 ANXIETY DURING PREGNANCY: Status: ACTIVE | Noted: 2019-04-12

## 2019-04-12 PROBLEM — F41.9 ANXIETY DURING PREGNANCY: Status: ACTIVE | Noted: 2019-04-12

## 2019-04-12 NOTE — PROGRESS NOTES
Marquis Bishop is well. SHe is having increased anxiety with chronic fear of CA. I gave contact info for [facility] intake and she has outside PCP.  I'll send referral request.

## 2019-04-12 NOTE — TELEPHONE ENCOUNTER
Catherine. This is the patient we discussed on the 10th. SHe has an outside PCP and needs Behavioral Health referral for anxiety affecting pregnancy. I did already give her contact info for [facility].

## 2019-05-08 ENCOUNTER — ROUTINE PRENATAL (OUTPATIENT)
Dept: OBGYN CLINIC | Facility: CLINIC | Age: 29
End: 2019-05-08

## 2019-05-08 VITALS
SYSTOLIC BLOOD PRESSURE: 105 MMHG | HEART RATE: 67 BPM | BODY MASS INDEX: 27 KG/M2 | DIASTOLIC BLOOD PRESSURE: 67 MMHG | WEIGHT: 169.63 LBS

## 2019-05-08 DIAGNOSIS — Z34.92 ENCOUNTER FOR SUPERVISION OF NORMAL PREGNANCY IN SECOND TRIMESTER, UNSPECIFIED GRAVIDITY: Primary | ICD-10-CM

## 2019-05-08 PROCEDURE — 81002 URINALYSIS NONAUTO W/O SCOPE: CPT | Performed by: OBSTETRICS & GYNECOLOGY

## 2019-05-20 ENCOUNTER — APPOINTMENT (OUTPATIENT)
Dept: LAB | Age: 29
End: 2019-05-20
Attending: OBSTETRICS & GYNECOLOGY
Payer: COMMERCIAL

## 2019-05-20 DIAGNOSIS — Z34.92 ENCOUNTER FOR SUPERVISION OF NORMAL PREGNANCY IN SECOND TRIMESTER, UNSPECIFIED GRAVIDITY: ICD-10-CM

## 2019-05-20 PROCEDURE — 85027 COMPLETE CBC AUTOMATED: CPT

## 2019-05-20 PROCEDURE — 36415 COLL VENOUS BLD VENIPUNCTURE: CPT

## 2019-05-20 PROCEDURE — 82950 GLUCOSE TEST: CPT

## 2019-05-24 ENCOUNTER — TELEPHONE (OUTPATIENT)
Dept: OBGYN CLINIC | Facility: CLINIC | Age: 29
End: 2019-05-24

## 2019-05-24 NOTE — TELEPHONE ENCOUNTER
Advised pt on importance of PN appts and pt should be seen sooner than 6/6. 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  Pt accepted appt on 5/28 at 5 pm.

## 2019-05-24 NOTE — TELEPHONE ENCOUNTER
Pt missed  28 week PN due yesterday pt got stuck by train and didn't make it, pt has apt scheduled for 6/6 for 30th week, wants to know if big deal missing 28 wk apt

## 2019-05-28 ENCOUNTER — ROUTINE PRENATAL (OUTPATIENT)
Dept: OBGYN CLINIC | Facility: CLINIC | Age: 29
End: 2019-05-28

## 2019-05-28 VITALS
HEART RATE: 80 BPM | BODY MASS INDEX: 28 KG/M2 | WEIGHT: 176 LBS | SYSTOLIC BLOOD PRESSURE: 122 MMHG | DIASTOLIC BLOOD PRESSURE: 68 MMHG

## 2019-05-28 DIAGNOSIS — Z34.93 ENCOUNTER FOR SUPERVISION OF NORMAL PREGNANCY IN THIRD TRIMESTER, UNSPECIFIED GRAVIDITY: Primary | ICD-10-CM

## 2019-05-28 PROCEDURE — 81002 URINALYSIS NONAUTO W/O SCOPE: CPT | Performed by: OBSTETRICS & GYNECOLOGY

## 2019-06-11 ENCOUNTER — ROUTINE PRENATAL (OUTPATIENT)
Dept: OBGYN CLINIC | Facility: CLINIC | Age: 29
End: 2019-06-11

## 2019-06-11 VITALS
HEART RATE: 80 BPM | BODY MASS INDEX: 29 KG/M2 | WEIGHT: 178.38 LBS | SYSTOLIC BLOOD PRESSURE: 105 MMHG | DIASTOLIC BLOOD PRESSURE: 68 MMHG

## 2019-06-11 DIAGNOSIS — Z34.93 ENCOUNTER FOR SUPERVISION OF NORMAL PREGNANCY IN THIRD TRIMESTER, UNSPECIFIED GRAVIDITY: Primary | ICD-10-CM

## 2019-06-11 PROCEDURE — 81002 URINALYSIS NONAUTO W/O SCOPE: CPT | Performed by: OBSTETRICS & GYNECOLOGY

## 2019-06-19 ENCOUNTER — LAB ENCOUNTER (OUTPATIENT)
Dept: LAB | Age: 29
End: 2019-06-19
Attending: OBSTETRICS & GYNECOLOGY
Payer: COMMERCIAL

## 2019-06-19 DIAGNOSIS — Z34.93 ENCOUNTER FOR SUPERVISION OF NORMAL PREGNANCY IN THIRD TRIMESTER, UNSPECIFIED GRAVIDITY: ICD-10-CM

## 2019-06-19 PROCEDURE — 85025 COMPLETE CBC W/AUTO DIFF WBC: CPT

## 2019-06-19 PROCEDURE — 36415 COLL VENOUS BLD VENIPUNCTURE: CPT

## 2019-06-27 ENCOUNTER — ROUTINE PRENATAL (OUTPATIENT)
Dept: OBGYN CLINIC | Facility: CLINIC | Age: 29
End: 2019-06-27

## 2019-06-27 VITALS
HEART RATE: 92 BPM | DIASTOLIC BLOOD PRESSURE: 69 MMHG | SYSTOLIC BLOOD PRESSURE: 110 MMHG | BODY MASS INDEX: 30 KG/M2 | WEIGHT: 184.63 LBS

## 2019-06-27 DIAGNOSIS — Z34.93 ENCOUNTER FOR SUPERVISION OF NORMAL PREGNANCY IN THIRD TRIMESTER, UNSPECIFIED GRAVIDITY: Primary | ICD-10-CM

## 2019-06-27 PROCEDURE — 81002 URINALYSIS NONAUTO W/O SCOPE: CPT | Performed by: OBSTETRICS & GYNECOLOGY

## 2019-06-27 NOTE — PROGRESS NOTES
C/O inner thigh burning with walking and when she touches it. No pain or swelling. Radiates into labia. PE wnl. No signs of DVT or superficial varicosities. Ben hydration and stretching. DVT precautions given. Discussed pp contraception.   RTC 2 wks

## 2019-07-10 ENCOUNTER — ROUTINE PRENATAL (OUTPATIENT)
Dept: OBGYN CLINIC | Facility: CLINIC | Age: 29
End: 2019-07-10

## 2019-07-10 VITALS
HEART RATE: 80 BPM | SYSTOLIC BLOOD PRESSURE: 111 MMHG | BODY MASS INDEX: 30 KG/M2 | DIASTOLIC BLOOD PRESSURE: 71 MMHG | WEIGHT: 188 LBS

## 2019-07-10 DIAGNOSIS — Z34.93 ENCOUNTER FOR SUPERVISION OF NORMAL PREGNANCY IN THIRD TRIMESTER, UNSPECIFIED GRAVIDITY: Primary | ICD-10-CM

## 2019-07-10 LAB
APPEARANCE: CLEAR
MULTISTIX LOT#: NORMAL NUMERIC
PH, URINE: 7 (ref 4.5–8)
SPECIFIC GRAVITY: 1.02 (ref 1–1.03)
URINE-COLOR: YELLOW

## 2019-07-10 PROCEDURE — 81002 URINALYSIS NONAUTO W/O SCOPE: CPT | Performed by: OBSTETRICS & GYNECOLOGY

## 2019-07-10 PROCEDURE — 90471 IMMUNIZATION ADMIN: CPT | Performed by: OBSTETRICS & GYNECOLOGY

## 2019-07-10 PROCEDURE — 90715 TDAP VACCINE 7 YRS/> IM: CPT | Performed by: OBSTETRICS & GYNECOLOGY

## 2019-07-10 NOTE — PROGRESS NOTES
Pt states she discussed inner thigh burning/tenderness intermittent with JUSTEN last visit- still persists- agree with his recs for stretching and hydration- denies numbness, tingling change in skin temp, Tdap today

## 2019-07-22 ENCOUNTER — LAB ENCOUNTER (OUTPATIENT)
Dept: LAB | Age: 29
End: 2019-07-22
Attending: OBSTETRICS & GYNECOLOGY
Payer: COMMERCIAL

## 2019-07-22 DIAGNOSIS — Z34.93 ENCOUNTER FOR SUPERVISION OF NORMAL PREGNANCY IN THIRD TRIMESTER, UNSPECIFIED GRAVIDITY: ICD-10-CM

## 2019-07-22 LAB
DEPRECATED RDW RBC AUTO: 65.9 FL (ref 35.1–46.3)
ERYTHROCYTE [DISTWIDTH] IN BLOOD BY AUTOMATED COUNT: 21.8 % (ref 11–15)
HCT VFR BLD AUTO: 37 % (ref 35–48)
HGB BLD-MCNC: 12.3 G/DL (ref 12–16)
MCH RBC QN AUTO: 28.5 PG (ref 26–34)
MCHC RBC AUTO-ENTMCNC: 33.2 G/DL (ref 31–37)
MCV RBC AUTO: 85.6 FL (ref 80–100)
PLATELET # BLD AUTO: 190 10(3)UL (ref 150–450)
RBC # BLD AUTO: 4.32 X10(6)UL (ref 3.8–5.3)
T PALLIDUM AB SER QL: NEGATIVE
WBC # BLD AUTO: 12.7 X10(3) UL (ref 4–11)

## 2019-07-22 PROCEDURE — 36415 COLL VENOUS BLD VENIPUNCTURE: CPT

## 2019-07-22 PROCEDURE — 86780 TREPONEMA PALLIDUM: CPT

## 2019-07-22 PROCEDURE — 87389 HIV-1 AG W/HIV-1&-2 AB AG IA: CPT

## 2019-07-22 PROCEDURE — 85027 COMPLETE CBC AUTOMATED: CPT

## 2019-07-23 ENCOUNTER — ROUTINE PRENATAL (OUTPATIENT)
Dept: OBGYN CLINIC | Facility: CLINIC | Age: 29
End: 2019-07-23

## 2019-07-23 VITALS
SYSTOLIC BLOOD PRESSURE: 110 MMHG | WEIGHT: 194.19 LBS | HEART RATE: 75 BPM | DIASTOLIC BLOOD PRESSURE: 72 MMHG | BODY MASS INDEX: 31 KG/M2

## 2019-07-23 DIAGNOSIS — Z34.93 ENCOUNTER FOR SUPERVISION OF NORMAL PREGNANCY IN THIRD TRIMESTER, UNSPECIFIED GRAVIDITY: Primary | ICD-10-CM

## 2019-07-23 LAB
MULTISTIX LOT#: NORMAL NUMERIC
PH, URINE: 7 (ref 4.5–8)
SPECIFIC GRAVITY: 1 (ref 1–1.03)
UROBILINOGEN,SEMI-QN: 0 MG/DL (ref 0–1.9)

## 2019-07-23 PROCEDURE — 81002 URINALYSIS NONAUTO W/O SCOPE: CPT | Performed by: OBSTETRICS & GYNECOLOGY

## 2019-07-23 NOTE — PROGRESS NOTES
GBS cx done. Labs normal. C/o nausea between 2-4 pm daily. No diarrhea / constipation / fevers/ abd pain. Take iron w/ breakfast & tums prn.  RTC 1 wk

## 2019-08-01 ENCOUNTER — ROUTINE PRENATAL (OUTPATIENT)
Dept: OBGYN CLINIC | Facility: CLINIC | Age: 29
End: 2019-08-01

## 2019-08-01 VITALS
SYSTOLIC BLOOD PRESSURE: 109 MMHG | DIASTOLIC BLOOD PRESSURE: 70 MMHG | HEART RATE: 84 BPM | WEIGHT: 195 LBS | BODY MASS INDEX: 31 KG/M2

## 2019-08-01 DIAGNOSIS — Z34.93 ENCOUNTER FOR SUPERVISION OF NORMAL PREGNANCY IN THIRD TRIMESTER, UNSPECIFIED GRAVIDITY: Primary | ICD-10-CM

## 2019-08-01 LAB
MULTISTIX LOT#: NORMAL NUMERIC
PH, URINE: 6 (ref 4.5–8)
SPECIFIC GRAVITY: 1.01 (ref 1–1.03)

## 2019-08-01 PROCEDURE — 81002 URINALYSIS NONAUTO W/O SCOPE: CPT | Performed by: OBSTETRICS & GYNECOLOGY

## 2019-08-01 NOTE — PROGRESS NOTES
GBS neg. Reviewed 35 wk labs. C/o possible lump in left breast--  Exam--Left breast with fibrocystic changes. No apparent mass. Will continue to monitor.    RTC 1 wk

## 2019-08-09 ENCOUNTER — ROUTINE PRENATAL (OUTPATIENT)
Dept: OBGYN CLINIC | Facility: CLINIC | Age: 29
End: 2019-08-09

## 2019-08-09 VITALS
SYSTOLIC BLOOD PRESSURE: 114 MMHG | DIASTOLIC BLOOD PRESSURE: 73 MMHG | WEIGHT: 199 LBS | BODY MASS INDEX: 32 KG/M2 | HEART RATE: 102 BPM

## 2019-08-09 DIAGNOSIS — Z34.83 ENCOUNTER FOR SUPERVISION OF OTHER NORMAL PREGNANCY IN THIRD TRIMESTER: Primary | ICD-10-CM

## 2019-08-09 LAB
APPEARANCE: CLEAR
MULTISTIX LOT#: NORMAL NUMERIC
URINE-COLOR: YELLOW

## 2019-08-09 PROCEDURE — 81002 URINALYSIS NONAUTO W/O SCOPE: CPT | Performed by: OBSTETRICS & GYNECOLOGY

## 2019-08-11 NOTE — PROGRESS NOTES
C/O possible LOF x 3 days. Spec shows thin mucoid DC, nitrazine and fern negative. Guidance given on labor / LOF.

## 2019-08-16 ENCOUNTER — ROUTINE PRENATAL (OUTPATIENT)
Dept: OBGYN CLINIC | Facility: CLINIC | Age: 29
End: 2019-08-16

## 2019-08-16 VITALS
SYSTOLIC BLOOD PRESSURE: 111 MMHG | DIASTOLIC BLOOD PRESSURE: 71 MMHG | HEART RATE: 93 BPM | WEIGHT: 201 LBS | BODY MASS INDEX: 32 KG/M2

## 2019-08-16 DIAGNOSIS — Z34.93 ENCOUNTER FOR SUPERVISION OF NORMAL PREGNANCY IN THIRD TRIMESTER, UNSPECIFIED GRAVIDITY: Primary | ICD-10-CM

## 2019-08-16 LAB
APPEARANCE: CLEAR
GLUCOSE (URINE DIPSTICK): 100 MG/DL
MULTISTIX LOT#: NORMAL NUMERIC
PH, URINE: 6.5 (ref 4.5–8)
SPECIFIC GRAVITY: 1.02 (ref 1–1.03)
URINE-COLOR: YELLOW

## 2019-08-16 PROCEDURE — 81002 URINALYSIS NONAUTO W/O SCOPE: CPT | Performed by: OBSTETRICS & GYNECOLOGY

## 2019-08-21 ENCOUNTER — ROUTINE PRENATAL (OUTPATIENT)
Dept: OBGYN CLINIC | Facility: CLINIC | Age: 29
End: 2019-08-21

## 2019-08-21 ENCOUNTER — TELEPHONE (OUTPATIENT)
Dept: OBGYN UNIT | Facility: HOSPITAL | Age: 29
End: 2019-08-21

## 2019-08-21 ENCOUNTER — HOSPITAL ENCOUNTER (OUTPATIENT)
Facility: HOSPITAL | Age: 29
Setting detail: OBSERVATION
Discharge: HOME | End: 2019-08-21
Attending: OBSTETRICS & GYNECOLOGY | Admitting: OBSTETRICS & GYNECOLOGY
Payer: COMMERCIAL

## 2019-08-21 VITALS
SYSTOLIC BLOOD PRESSURE: 109 MMHG | BODY MASS INDEX: 32 KG/M2 | WEIGHT: 201 LBS | HEART RATE: 96 BPM | DIASTOLIC BLOOD PRESSURE: 72 MMHG

## 2019-08-21 VITALS — RESPIRATION RATE: 16 BRPM | SYSTOLIC BLOOD PRESSURE: 116 MMHG | DIASTOLIC BLOOD PRESSURE: 76 MMHG | HEART RATE: 91 BPM

## 2019-08-21 DIAGNOSIS — Z34.93 ENCOUNTER FOR SUPERVISION OF NORMAL PREGNANCY IN THIRD TRIMESTER, UNSPECIFIED GRAVIDITY: Primary | ICD-10-CM

## 2019-08-21 PROBLEM — Z34.90 PREGNANCY: Status: ACTIVE | Noted: 2019-08-21

## 2019-08-21 LAB
APPEARANCE: CLEAR
MULTISTIX LOT#: NORMAL NUMERIC
PH, URINE: 7 (ref 4.5–8)
SPECIFIC GRAVITY: 1.01 (ref 1–1.03)
URINE-COLOR: YELLOW
UROBILINOGEN,SEMI-QN: 0.2 MG/DL (ref 0–1.9)

## 2019-08-21 PROCEDURE — 59025 FETAL NON-STRESS TEST: CPT | Performed by: OBSTETRICS & GYNECOLOGY

## 2019-08-21 PROCEDURE — 76815 OB US LIMITED FETUS(S): CPT | Performed by: OBSTETRICS & GYNECOLOGY

## 2019-08-21 PROCEDURE — 81002 URINALYSIS NONAUTO W/O SCOPE: CPT | Performed by: OBSTETRICS & GYNECOLOGY

## 2019-08-21 NOTE — NST
Nonstress Test   Patient: Mansoor Le    Gestation: 40w6d    NST: sent from office for post dates NST       Variability: Moderate           Accelerations: Yes           Decelerations: None            Baseline: 140 BPM           Uterine Irritability

## 2019-08-21 NOTE — PROGRESS NOTES
Admitted ambulatory accompanied by  for NST for post dates. Just seen at clinic. POC discussed and EFM on. Drinking water.

## 2019-08-23 ENCOUNTER — HOSPITAL ENCOUNTER (INPATIENT)
Facility: HOSPITAL | Age: 29
LOS: 3 days | Discharge: HOME | End: 2019-08-26
Attending: OBSTETRICS & GYNECOLOGY | Admitting: OBSTETRICS & GYNECOLOGY
Payer: COMMERCIAL

## 2019-08-23 ENCOUNTER — APPOINTMENT (OUTPATIENT)
Dept: OBGYN CLINIC | Facility: HOSPITAL | Age: 29
End: 2019-08-23
Payer: COMMERCIAL

## 2019-08-23 PROBLEM — O48.0 POST-DATES PREGNANCY: Status: ACTIVE | Noted: 2019-08-23

## 2019-08-23 LAB
ANTIBODY SCREEN: NEGATIVE
DEPRECATED RDW RBC AUTO: 48.4 FL (ref 35.1–46.3)
ERYTHROCYTE [DISTWIDTH] IN BLOOD BY AUTOMATED COUNT: 15.9 % (ref 11–15)
HCT VFR BLD AUTO: 35.7 % (ref 35–48)
HGB BLD-MCNC: 11.8 G/DL (ref 12–16)
MCH RBC QN AUTO: 29.3 PG (ref 26–34)
MCHC RBC AUTO-ENTMCNC: 33.1 G/DL (ref 31–37)
MCV RBC AUTO: 88.6 FL (ref 80–100)
PLATELET # BLD AUTO: 180 10(3)UL (ref 150–450)
RBC # BLD AUTO: 4.03 X10(6)UL (ref 3.8–5.3)
RH BLOOD TYPE: POSITIVE
WBC # BLD AUTO: 10.1 X10(3) UL (ref 4–11)

## 2019-08-23 PROCEDURE — 3E0P7VZ INTRODUCTION OF HORMONE INTO FEMALE REPRODUCTIVE, VIA NATURAL OR ARTIFICIAL OPENING: ICD-10-PCS | Performed by: OBSTETRICS & GYNECOLOGY

## 2019-08-23 RX ORDER — TRISODIUM CITRATE DIHYDRATE AND CITRIC ACID MONOHYDRATE 500; 334 MG/5ML; MG/5ML
30 SOLUTION ORAL AS NEEDED
Status: DISCONTINUED | OUTPATIENT
Start: 2019-08-23 | End: 2019-08-24 | Stop reason: HOSPADM

## 2019-08-23 RX ORDER — SODIUM CHLORIDE 0.9 % (FLUSH) 0.9 %
10 SYRINGE (ML) INJECTION AS NEEDED
Status: DISCONTINUED | OUTPATIENT
Start: 2019-08-23 | End: 2019-08-24 | Stop reason: HOSPADM

## 2019-08-23 RX ORDER — ZOLPIDEM TARTRATE 5 MG/1
10 TABLET ORAL NIGHTLY PRN
Status: DISCONTINUED | OUTPATIENT
Start: 2019-08-23 | End: 2019-08-26

## 2019-08-23 RX ORDER — AMMONIA INHALANTS 0.04 G/.3ML
0.3 INHALANT RESPIRATORY (INHALATION) AS NEEDED
Status: DISCONTINUED | OUTPATIENT
Start: 2019-08-23 | End: 2019-08-24

## 2019-08-23 RX ORDER — SODIUM CHLORIDE, SODIUM LACTATE, POTASSIUM CHLORIDE, CALCIUM CHLORIDE 600; 310; 30; 20 MG/100ML; MG/100ML; MG/100ML; MG/100ML
INJECTION, SOLUTION INTRAVENOUS CONTINUOUS
Status: DISCONTINUED | OUTPATIENT
Start: 2019-08-23 | End: 2019-08-24 | Stop reason: HOSPADM

## 2019-08-23 RX ORDER — CALCIUM CARBONATE 200(500)MG
1000 TABLET,CHEWABLE ORAL 2 TIMES DAILY PRN
Status: DISCONTINUED | OUTPATIENT
Start: 2019-08-23 | End: 2019-08-26

## 2019-08-23 RX ORDER — IBUPROFEN 600 MG/1
600 TABLET ORAL ONCE AS NEEDED
Status: DISCONTINUED | OUTPATIENT
Start: 2019-08-23 | End: 2019-08-24 | Stop reason: HOSPADM

## 2019-08-23 RX ORDER — LIDOCAINE HYDROCHLORIDE 10 MG/ML
30 INJECTION, SOLUTION EPIDURAL; INFILTRATION; INTRACAUDAL; PERINEURAL ONCE
Status: DISCONTINUED | OUTPATIENT
Start: 2019-08-23 | End: 2019-08-24 | Stop reason: HOSPADM

## 2019-08-23 RX ORDER — DEXTROSE, SODIUM CHLORIDE, SODIUM LACTATE, POTASSIUM CHLORIDE, AND CALCIUM CHLORIDE 5; .6; .31; .03; .02 G/100ML; G/100ML; G/100ML; G/100ML; G/100ML
INJECTION, SOLUTION INTRAVENOUS CONTINUOUS
Status: DISCONTINUED | OUTPATIENT
Start: 2019-08-23 | End: 2019-08-24 | Stop reason: HOSPADM

## 2019-08-23 RX ORDER — TERBUTALINE SULFATE 1 MG/ML
0.25 INJECTION, SOLUTION SUBCUTANEOUS AS NEEDED
Status: DISCONTINUED | OUTPATIENT
Start: 2019-08-23 | End: 2019-08-24 | Stop reason: HOSPADM

## 2019-08-24 ENCOUNTER — ANESTHESIA EVENT (OUTPATIENT)
Dept: OBGYN UNIT | Facility: HOSPITAL | Age: 29
End: 2019-08-24
Payer: COMMERCIAL

## 2019-08-24 ENCOUNTER — TELEPHONE (OUTPATIENT)
Dept: OBGYN CLINIC | Facility: CLINIC | Age: 29
End: 2019-08-24

## 2019-08-24 ENCOUNTER — ANESTHESIA (OUTPATIENT)
Dept: OBGYN UNIT | Facility: HOSPITAL | Age: 29
End: 2019-08-24
Payer: COMMERCIAL

## 2019-08-24 PROCEDURE — 10H07YZ INSERTION OF OTHER DEVICE INTO PRODUCTS OF CONCEPTION, VIA NATURAL OR ARTIFICIAL OPENING: ICD-10-PCS | Performed by: OBSTETRICS & GYNECOLOGY

## 2019-08-24 PROCEDURE — 59400 OBSTETRICAL CARE: CPT | Performed by: OBSTETRICS & GYNECOLOGY

## 2019-08-24 RX ORDER — LIDOCAINE HYDROCHLORIDE 10 MG/ML
INJECTION, SOLUTION EPIDURAL; INFILTRATION; INTRACAUDAL; PERINEURAL AS NEEDED
Status: DISCONTINUED | OUTPATIENT
Start: 2019-08-24 | End: 2019-08-24 | Stop reason: SURG

## 2019-08-24 RX ORDER — NALBUPHINE HCL 10 MG/ML
2.5 AMPUL (ML) INJECTION
Status: DISCONTINUED | OUTPATIENT
Start: 2019-08-24 | End: 2019-08-26

## 2019-08-24 RX ORDER — DOCUSATE SODIUM 100 MG/1
100 CAPSULE, LIQUID FILLED ORAL 2 TIMES DAILY
Status: DISCONTINUED | OUTPATIENT
Start: 2019-08-24 | End: 2019-08-26

## 2019-08-24 RX ORDER — SIMETHICONE 80 MG
80 TABLET,CHEWABLE ORAL 3 TIMES DAILY PRN
Status: DISCONTINUED | OUTPATIENT
Start: 2019-08-24 | End: 2019-08-26

## 2019-08-24 RX ORDER — LIDOCAINE HYDROCHLORIDE AND EPINEPHRINE 20; 5 MG/ML; UG/ML
20 INJECTION, SOLUTION EPIDURAL; INFILTRATION; INTRACAUDAL; PERINEURAL ONCE
Status: DISCONTINUED | OUTPATIENT
Start: 2019-08-24 | End: 2019-08-26

## 2019-08-24 RX ORDER — ONDANSETRON 2 MG/ML
INJECTION INTRAMUSCULAR; INTRAVENOUS
Status: COMPLETED
Start: 2019-08-24 | End: 2019-08-24

## 2019-08-24 RX ORDER — BISACODYL 10 MG
10 SUPPOSITORY, RECTAL RECTAL ONCE AS NEEDED
Status: DISCONTINUED | OUTPATIENT
Start: 2019-08-24 | End: 2019-08-26

## 2019-08-24 RX ORDER — LIDOCAINE HYDROCHLORIDE AND EPINEPHRINE 15; 5 MG/ML; UG/ML
INJECTION, SOLUTION EPIDURAL AS NEEDED
Status: DISCONTINUED | OUTPATIENT
Start: 2019-08-24 | End: 2019-08-24 | Stop reason: SURG

## 2019-08-24 RX ORDER — DIAPER,BRIEF,INFANT-TODD,DISP
1 EACH MISCELLANEOUS EVERY 6 HOURS PRN
Status: DISCONTINUED | OUTPATIENT
Start: 2019-08-24 | End: 2019-08-26

## 2019-08-24 RX ORDER — AMMONIA INHALANTS 0.04 G/.3ML
0.3 INHALANT RESPIRATORY (INHALATION) AS NEEDED
Status: DISCONTINUED | OUTPATIENT
Start: 2019-08-24 | End: 2019-08-26

## 2019-08-24 RX ORDER — EPHEDRINE SULFATE/0.9% NACL/PF 25 MG/5 ML
5 SYRINGE (ML) INTRAVENOUS AS NEEDED
Status: DISCONTINUED | OUTPATIENT
Start: 2019-08-24 | End: 2019-08-24

## 2019-08-24 RX ORDER — CHOLECALCIFEROL (VITAMIN D3) 25 MCG
1 TABLET,CHEWABLE ORAL DAILY
Status: DISCONTINUED | OUTPATIENT
Start: 2019-08-24 | End: 2019-08-26

## 2019-08-24 RX ORDER — ONDANSETRON 2 MG/ML
4 INJECTION INTRAMUSCULAR; INTRAVENOUS EVERY 6 HOURS PRN
Status: DISCONTINUED | OUTPATIENT
Start: 2019-08-24 | End: 2019-08-24

## 2019-08-24 RX ORDER — HYDROCODONE BITARTRATE AND ACETAMINOPHEN 5; 325 MG/1; MG/1
1 TABLET ORAL EVERY 6 HOURS PRN
Status: DISCONTINUED | OUTPATIENT
Start: 2019-08-24 | End: 2019-08-26

## 2019-08-24 RX ORDER — ONDANSETRON 2 MG/ML
4 INJECTION INTRAMUSCULAR; INTRAVENOUS EVERY 6 HOURS PRN
Status: DISCONTINUED | OUTPATIENT
Start: 2019-08-24 | End: 2019-08-26

## 2019-08-24 RX ORDER — BUPIVACAINE HYDROCHLORIDE 2.5 MG/ML
30 INJECTION, SOLUTION EPIDURAL; INFILTRATION; INTRACAUDAL ONCE
Status: COMPLETED | OUTPATIENT
Start: 2019-08-24 | End: 2019-08-24

## 2019-08-24 RX ORDER — IBUPROFEN 600 MG/1
600 TABLET ORAL EVERY 6 HOURS PRN
Status: DISCONTINUED | OUTPATIENT
Start: 2019-08-24 | End: 2019-08-26

## 2019-08-24 RX ORDER — EPHEDRINE SULFATE/0.9% NACL/PF 25 MG/5 ML
5 SYRINGE (ML) INTRAVENOUS AS NEEDED
Status: DISCONTINUED | OUTPATIENT
Start: 2019-08-24 | End: 2019-08-26

## 2019-08-24 RX ORDER — NALBUPHINE HCL 10 MG/ML
2.5 AMPUL (ML) INJECTION
Status: DISCONTINUED | OUTPATIENT
Start: 2019-08-24 | End: 2019-08-24

## 2019-08-24 RX ORDER — SODIUM CHLORIDE 0.9 % (FLUSH) 0.9 %
10 SYRINGE (ML) INJECTION AS NEEDED
Status: DISCONTINUED | OUTPATIENT
Start: 2019-08-24 | End: 2019-08-26

## 2019-08-24 RX ADMIN — LIDOCAINE HYDROCHLORIDE AND EPINEPHRINE 3 ML: 15; 5 INJECTION, SOLUTION EPIDURAL at 15:36:00

## 2019-08-24 RX ADMIN — LIDOCAINE HYDROCHLORIDE 3 ML: 10 INJECTION, SOLUTION EPIDURAL; INFILTRATION; INTRACAUDAL; PERINEURAL at 15:27:00

## 2019-08-24 NOTE — PROGRESS NOTES
[facility]    Labor Progress Note    Mansoor Le Patient Status:  Inpatient    4/15/1990 MRN [id number]   Location [address]  Attending Alicia Olivares MD   Hosp Day # 1 PCP Cely Velasquez MD

## 2019-08-24 NOTE — ANESTHESIA PROCEDURE NOTES
Labor Analgesia  Performed by: Jarocho Eller MD  Authorized by: Jarocho Eller MD     Patient Location:  OB  Start Time:  8/24/2019 3:24 PM  End Time:  8/24/2019 3:40 PM  Site Identification: surface landmarks    Reason for Block: labor epidural

## 2019-08-24 NOTE — PROGRESS NOTES
At 0950 RN in room,  Pt returning from BR crying and hyperventilating. Pt stating \"it's running down my leg, its blood\". RN examed fluid on pts leg, noted to be clear in color. RN explained to pt that it appears to be Amniotic fluid, with no blood noted.

## 2019-08-24 NOTE — PROGRESS NOTES
8/24/2019, 5:11 PM    Subjective:  Patient is resting comfortably after epidural; some difficulty after العلي placed    Objective:   08/24/19  1615 08/24/19  1630 08/24/19  1645 08/24/19  1700   BP: 117/69 94/52 94/48 103/58   BP Location:       Pulse: 78

## 2019-08-24 NOTE — ANESTHESIA PREPROCEDURE EVALUATION
Anesthesia PreOp Note    HPI:     William Alfred is a 30year old female who presents for preoperative consultation requested by: * No surgeons listed *    Date of Surgery: 8/24/2019    * No procedures listed *  Indication: * No pre-op diagnosis enter Once Jarocho Eller MD     lidocaine-EPINEPHrine 2 %-1:[id number] injection 20 mL 20 mL Epidural Once Jarocho Eller MD     Lidocaine HCl (PF) (XYLOCAINE) 1 % injection SOLN  Injection PRN Jarocho Eller MD 3 mL at 08/24/19 1527    lidocaine-EPI Maternal Grandmother          of leukemia   • Diabetes Maternal Grandfather    • Cancer Paternal Grandfather      Social History    Socioeconomic History      Marital status:       Spouse name: Not on file      Number of children: 1      Years o height or weight on file to calculate BMI. oral temperature is 98.9 °F (37.2 °C). Her blood pressure is 112/75 and her pulse is 70.  Her respiration is 16.    08/24/19  0845 08/24/19  0930 08/24/19  1030 08/24/19  1245   BP: 116/74 121/74 116/77 112/75

## 2019-08-24 NOTE — H&P
[facility] Patient Status:  Inpatient    4/15/1990 MRN [id number]   Location [address] [facility] Attending Galilea Story MD   Hosp Day # 0 PCP Charlotte Bolanos MD Tobacco Use      Smoking status: Never Smoker      Smokeless tobacco: Never Used    Alcohol use: Yes      Comment: SOCIALLY       Allergies/Medications:    Allergies:     Adhesive Tape               Medications:    Medications Prior to Admission:  Ferrous S Pre-admission, admission, and post admission procedures and expectations were discussed in detail.     All questions answered; all appropriate consents will be signed at the [address] [place] [address]  8/23/2019  8:25 PM

## 2019-08-25 LAB
BASOPHILS # BLD AUTO: 0.05 X10(3) UL (ref 0–0.2)
BASOPHILS NFR BLD AUTO: 0.3 %
DEPRECATED RDW RBC AUTO: 48.7 FL (ref 35.1–46.3)
EOSINOPHIL # BLD AUTO: 0.05 X10(3) UL (ref 0–0.7)
EOSINOPHIL NFR BLD AUTO: 0.3 %
ERYTHROCYTE [DISTWIDTH] IN BLOOD BY AUTOMATED COUNT: 15.7 % (ref 11–15)
HCT VFR BLD AUTO: 35.2 % (ref 35–48)
HGB BLD-MCNC: 11.5 G/DL (ref 12–16)
IMM GRANULOCYTES # BLD AUTO: 0.11 X10(3) UL (ref 0–1)
IMM GRANULOCYTES NFR BLD: 0.8 %
LYMPHOCYTES # BLD AUTO: 2.23 X10(3) UL (ref 1–4)
LYMPHOCYTES NFR BLD AUTO: 15.5 %
MCH RBC QN AUTO: 29.5 PG (ref 26–34)
MCHC RBC AUTO-ENTMCNC: 32.7 G/DL (ref 31–37)
MCV RBC AUTO: 90.3 FL (ref 80–100)
MONOCYTES # BLD AUTO: 1.13 X10(3) UL (ref 0.1–1)
MONOCYTES NFR BLD AUTO: 7.9 %
NEUTROPHILS # BLD AUTO: 10.81 X10 (3) UL (ref 1.5–7.7)
NEUTROPHILS # BLD AUTO: 10.81 X10(3) UL (ref 1.5–7.7)
NEUTROPHILS NFR BLD AUTO: 75.2 %
PLATELET # BLD AUTO: 166 10(3)UL (ref 150–450)
RBC # BLD AUTO: 3.9 X10(6)UL (ref 3.8–5.3)
WBC # BLD AUTO: 14.4 X10(3) UL (ref 4–11)

## 2019-08-25 NOTE — PROGRESS NOTES
8/24/2019, 7:27 PM    Subjective:  Patient is feeling rectal pressure and need to push    Objective:   08/24/19  1730 08/24/19  1745 08/24/19  1800 08/24/19  1922   BP: 105/65 99/55 97/37 113/68   BP Location:       Pulse: 57 58 63 79   Resp:  16     Temp:

## 2019-08-25 NOTE — LACTATION NOTE
LACTATION NOTE - MOTHER      Evaluation Type: Inpatient    Problems identified  Problems identified: Knowledge deficit    Maternal history  Maternal history: Anemia; Depression; Anxiety    Breastfeeding goal  Breastfeeding goal: To maintain breast milk feedi

## 2019-08-25 NOTE — ANESTHESIA POSTPROCEDURE EVALUATION
Patient: Terese Stern    Procedure Summary     Date:  08/24/19 Room / Location:      Anesthesia Start:  5479 Anesthesia Stop:  1950    Procedure:  LABOR ANALGESIA Diagnosis:      Scheduled Providers:   Anesthesiologist:  Alicia Durbin MD    An

## 2019-08-25 NOTE — L&D DELIVERY NOTE
[facility] HOSP - [facility]    Vaginal Delivery Note    Shreya Brennan Patient Status:  Inpatient    4/15/1990 MRN [id number]   Location [address]  Attending Galilea Story MD   Hosp Day # 1 PCP Charlotte Bolanos MD

## 2019-08-25 NOTE — PROGRESS NOTES
Patient received into room 363 via . Beside report received from [facility]. Patient transferred to bed without incident. Bed in locked and low position. Side rails up x 2. VSS. Fundus firm at u/u, lochia small, no clots noted. IV site unremarkable.  Pain

## 2019-08-25 NOTE — PROGRESS NOTES
[place] [facility] - [facility]    OB/Gyne Post  Progress Note      Mansoor Le Patient Status:  Inpatient    4/15/1990 MRN [id number]   Location [facility] 3SE Attending Alicia Olivares MD   Hosp Day # 2 PCP Cely Velasquez MD % 0.8 %       Specimens (From admission, onward)    None                  Assessment/Plan   30year oldyo  , s/p spontaneous vaginal, PPD# 1   Patient Active Problem List:     Anxiety during pregnancy     Pregnancy     Post-dates pregnancy  .     amb

## 2019-08-25 NOTE — LACTATION NOTE
This note was copied from a baby's chart.   LACTATION NOTE - INFANT    Evaluation Type  Evaluation Type: Inpatient    Problems & Assessment  Problems Diagnosed or Identified: Sleepy  Infant Assessment: Good skin turgor;Minimal hunger cues present;Oral mucou

## 2019-08-25 NOTE — DISCHARGE SUMMARY
[place] [facility] - [facility]    Discharge Summary    Sharlet Boxer Patient Status:  Inpatient    4/15/1990 MRN [id number]   Location [address]  Attending Mina Aguirre MD   [address] Day # 3       Delivering OB Clinician: LILLIAN

## 2019-08-26 VITALS
OXYGEN SATURATION: 100 % | TEMPERATURE: 98 F | DIASTOLIC BLOOD PRESSURE: 65 MMHG | HEART RATE: 75 BPM | SYSTOLIC BLOOD PRESSURE: 106 MMHG | RESPIRATION RATE: 16 BRPM

## 2019-08-26 RX ORDER — PSEUDOEPHEDRINE HCL 30 MG
100 TABLET ORAL 2 TIMES DAILY PRN
Qty: 30 CAPSULE | Refills: 0 | Status: SHIPPED | OUTPATIENT
Start: 2019-08-26 | End: 2019-10-01

## 2019-08-26 RX ORDER — IBUPROFEN 600 MG/1
600 TABLET ORAL EVERY 6 HOURS PRN
Qty: 20 TABLET | Refills: 0 | Status: SHIPPED | OUTPATIENT
Start: 2019-08-26 | End: 2019-11-13

## 2019-08-26 NOTE — LACTATION NOTE
This note was copied from a baby's chart.   LACTATION NOTE - INFANT    Evaluation Type  Evaluation Type: Inpatient    Problems & Assessment  Infant Assessment: Hunger cues present;Skin color: pink or appropriate for ethnicity    Feeding Assessment  Summary

## 2019-08-26 NOTE — LACTATION NOTE
LACTATION NOTE - MOTHER      Evaluation Type: Inpatient    Problems identified  Problems identified: Knowledge deficit    Maternal history  Maternal history: Anemia; Anxiety;Depression    Breastfeeding goal  Breastfeeding goal: To maintain breast milk feedi

## 2019-08-26 NOTE — PLAN OF CARE
Problem: POSTPARTUM  Goal: Long Term Goal:Experiences normal postpartum course  Description  INTERVENTIONS:  - Assess and monitor vital signs and lab values. - Assess fundus and lochia. - Provide ice/sitz baths for perineum discomfort.   - Monitor heali for signs of nipple pain/trauma. - Instruct and provide assistance with proper latch. - Review techniques for milk expression (breast pumping) and storage of breast milk. Provide pumping equipment/supplies, instructions and assistance, as needed.   Janusz Clear

## 2019-08-26 NOTE — PLAN OF CARE
Problem: PAIN - ADULT  Goal: Verbalizes/displays adequate comfort level or patient's stated pain goal  Description  INTERVENTIONS:  - Encourage pt to monitor pain and request assistance  - Assess pain using appropriate pain scale  - Administer analgesics symptoms of infection and hematoma. - Assess bladder function and monitor for bladder distention.  - Provide/instruct/assist with pericare as needed. - Provide VTE prophylaxis as needed.   - Monitor bowel function.  - Encourage ambulation and provide assi Provide LC support as needed. - Assess for and manage engorgement. - Provide breast feeding education handouts and information on community breast feeding support.    Outcome: Progressing  Goal: Establishment of adequate milk supply with medication/proced

## 2019-08-30 ENCOUNTER — TELEPHONE (OUTPATIENT)
Dept: OBGYN CLINIC | Facility: CLINIC | Age: 29
End: 2019-08-30

## 2019-09-20 ENCOUNTER — HOSPITAL ENCOUNTER (OUTPATIENT)
Age: 29
Discharge: HOME | End: 2019-09-20
Payer: COMMERCIAL

## 2019-09-20 VITALS
TEMPERATURE: 97 F | RESPIRATION RATE: 18 BRPM | OXYGEN SATURATION: 100 % | HEART RATE: 75 BPM | SYSTOLIC BLOOD PRESSURE: 134 MMHG | DIASTOLIC BLOOD PRESSURE: 65 MMHG

## 2019-09-20 DIAGNOSIS — H92.21 BLEEDING FROM EAR, RIGHT: Primary | ICD-10-CM

## 2019-09-20 PROCEDURE — 99212 OFFICE O/P EST SF 10 MIN: CPT

## 2019-09-21 NOTE — ED PROVIDER NOTES
Patient Seen in: [address]      History   Patient presents with:  Ear Problem    Stated Complaint: Ear pain    HPI    26-year-old female patient presents complaining of having blood from her right ear.   States that she right side. You can also opted to Q-tip which only shows wax. No blood noted.   Heart: Regular rate and rhythm, no murmur  Lungs: Normal respiratory effort, clear lungs  Abdomen: Soft,  nondistended, non tender  : No CVA tenderness  Skin: No rash, no le

## 2019-10-01 ENCOUNTER — POSTPARTUM (OUTPATIENT)
Dept: OBGYN CLINIC | Facility: CLINIC | Age: 29
End: 2019-10-01
Payer: COMMERCIAL

## 2019-10-01 VITALS
HEART RATE: 80 BPM | WEIGHT: 173 LBS | BODY MASS INDEX: 28 KG/M2 | SYSTOLIC BLOOD PRESSURE: 104 MMHG | DIASTOLIC BLOOD PRESSURE: 68 MMHG

## 2019-10-01 PROBLEM — F41.9 ANXIETY DURING PREGNANCY: Status: RESOLVED | Noted: 2019-04-12 | Resolved: 2019-10-01

## 2019-10-01 PROBLEM — O99.340 ANXIETY DURING PREGNANCY: Status: RESOLVED | Noted: 2019-04-12 | Resolved: 2019-10-01

## 2019-10-01 NOTE — H&P
DEVON    Candida Walton is a 30year old female [id number] here for 6 week post-partum visit. Patient delivered a  male infant on 19 via . Patient considering IUD for contraception. Patient is breast feeding.    Patient denies symptoms of depres ALLERGIES:    Adhesive Tape               PHYSICAL EXAM  Blood pressure 104/68, pulse 80, weight 173 lb (78.5 kg), last menstrual period 11/08/2018, currently breastfeeding.   General:  Well nourished, well developed woman in no acute distress  Abdomen:

## 2019-10-08 ENCOUNTER — TELEPHONE (OUTPATIENT)
Dept: OBGYN CLINIC | Facility: CLINIC | Age: 29
End: 2019-10-08

## 2019-10-08 DIAGNOSIS — Z32.00 PREGNANCY EXAMINATION OR TEST, PREGNANCY UNCONFIRMED: Primary | ICD-10-CM

## 2019-10-08 RX ORDER — MISOPROSTOL 200 UG/1
TABLET ORAL
Qty: 1 TABLET | Refills: 0 | Status: SHIPPED | OUTPATIENT
Start: 2019-10-08 | End: 2019-11-13

## 2019-10-08 NOTE — TELEPHONE ENCOUNTER
Pt wanting to get an IUD Mirena. Pt is breastfeeding. Pt delivered 8-24-19. Pt has not gotten a period. Can pt schedule IUD without getting a period? Can pt take cytotec the night before with breastfeeding. Sent to [address] [place] [address] for recs.

## 2019-10-08 NOTE — TELEPHONE ENCOUNTER
PT NOTIFIED OF RECS. SCHEDULED FOR IUD INSERTION ON FRI. PT AWARE TO TAKE CYTOTEC AT BEDTIME THUR NIGHT AND TO DO PREG TEST ON THUR. ORDER PLACED AND RX SENT.

## 2019-10-10 ENCOUNTER — APPOINTMENT (OUTPATIENT)
Dept: LAB | Age: 29
End: 2019-10-10
Attending: OBSTETRICS & GYNECOLOGY
Payer: COMMERCIAL

## 2019-10-10 DIAGNOSIS — Z32.00 PREGNANCY EXAMINATION OR TEST, PREGNANCY UNCONFIRMED: ICD-10-CM

## 2019-10-11 ENCOUNTER — OFFICE VISIT (OUTPATIENT)
Dept: OBGYN CLINIC | Facility: CLINIC | Age: 29
End: 2019-10-11
Payer: COMMERCIAL

## 2019-10-11 VITALS
SYSTOLIC BLOOD PRESSURE: 105 MMHG | BODY MASS INDEX: 28 KG/M2 | DIASTOLIC BLOOD PRESSURE: 67 MMHG | WEIGHT: 174.81 LBS | HEART RATE: 73 BPM

## 2019-10-11 DIAGNOSIS — Z30.430 ENCOUNTER FOR IUD INSERTION: Primary | ICD-10-CM

## 2019-10-11 PROCEDURE — 58300 INSERT INTRAUTERINE DEVICE: CPT | Performed by: OBSTETRICS & GYNECOLOGY

## 2019-10-11 NOTE — PROCEDURES
IUD Insertion     Pregnancy Results: negative from blood test   Birth control method(s) used: None. LMP: n/a (postpartum and breastfeeding)  Consent signed.   Procedure discussed with the patient in detail including indication, risks, benefits, alternativ

## 2019-11-13 ENCOUNTER — OFFICE VISIT (OUTPATIENT)
Dept: OBGYN CLINIC | Facility: CLINIC | Age: 29
End: 2019-11-13
Payer: COMMERCIAL

## 2019-11-13 VITALS
HEART RATE: 80 BPM | DIASTOLIC BLOOD PRESSURE: 66 MMHG | SYSTOLIC BLOOD PRESSURE: 101 MMHG | WEIGHT: 169 LBS | BODY MASS INDEX: 27 KG/M2

## 2019-11-13 DIAGNOSIS — Z30.431 IUD CHECK UP: Primary | ICD-10-CM

## 2019-11-13 PROCEDURE — 99213 OFFICE O/P EST LOW 20 MIN: CPT | Performed by: OBSTETRICS & GYNECOLOGY

## 2019-11-13 NOTE — H&P
HPI:  The patient is here for IUD check up. Mirena placed 1 month ago. No major issues. Mild occasional cramping. Some spotting. Pt states  felt a pinch 1 time during IC.       LPS: 8/9/17-pap neg    Reviewed medical and surgical history below Relationships      Social connections:        Talks on phone: Not on file        Gets together: Not on file        Attends Gnosticist service: Not on file        Active member of club or organization: Not on file        Attends meetings of clubs or [place] without lesions, no abnormal discharge; no blood  Cervix:  Normal without tenderness on motion; +strings (trimmed)  Uterus: normal in size, contour, position, mobility, without tenderness  Adnexa: normal without masses or tenderness  Perineum: normal    As

## 2019-12-02 NOTE — PROGRESS NOTES
Tawanna Falcon is a 33year old female. Patient presents with:  Voice Problem: Patient is here today for vocal evaluation    HPI:   She's been having difficulty with her voice the last several months.  She uses her voice a great deal and feels that she  Continue advair diskus and spiriva handihaler   daily, rinse mouth out after use.        Continue albuterol nebulizer treatment or rescue inhaler, 2 inhalations every 4-6 hours as needed for shortness of breath, wheezing or cough    · Complete course of prednisone- monitor blood sugar levels    · Submit sputum culture    · Continue supplemental O2 @ 2-3 LPM daily    · Follow up with cardiology / PCP       Recommend healthy diet/weight and exercise as tolerated     Follow-up in pulmonary clinic in 3 months or sooner with worsening of symptoms     Report to the ER with chest pain or difficulty breathing - Normal. Thyroid gland - Normal.   Psychiatric Normal Orientation - Oriented to time, place, person & situation. Appropriate mood and affect. Lymph Detail Normal Submental. Submandibular. Anterior cervical. Posterior cervical. Supraclavicular.    Eyes No

## 2020-02-26 ENCOUNTER — TELEPHONE (OUTPATIENT)
Dept: OBGYN CLINIC | Facility: CLINIC | Age: 30
End: 2020-02-26

## 2020-02-26 NOTE — TELEPHONE ENCOUNTER
Pt states when she delivered in 8/2019 the hospital never let her insurance know she was in the hospital.  Because of that they were giving her trouble with covering the delivery.   Pt states she has talked to many different departments in the hospital to t

## 2020-05-18 ENCOUNTER — TELEPHONE (OUTPATIENT)
Dept: OBGYN CLINIC | Facility: CLINIC | Age: 30
End: 2020-05-18

## 2020-05-18 NOTE — TELEPHONE ENCOUNTER
Informed pt JUSTEN states he will remove IUD at annual exam. Appt notes updated. Pt verbalized understanding.

## 2020-05-18 NOTE — TELEPHONE ENCOUNTER
Pt has an Annual with [address] 6/2020. Pt wants to know at that appt if JUSTEN would remove her IUD. She states she does not like it with cramping that she gets and she states that her  can feel it.

## 2020-06-03 ENCOUNTER — OFFICE VISIT (OUTPATIENT)
Dept: OBGYN CLINIC | Facility: CLINIC | Age: 30
End: 2020-06-03
Payer: COMMERCIAL

## 2020-06-03 VITALS
SYSTOLIC BLOOD PRESSURE: 120 MMHG | BODY MASS INDEX: 25 KG/M2 | WEIGHT: 156 LBS | DIASTOLIC BLOOD PRESSURE: 74 MMHG | HEART RATE: 78 BPM

## 2020-06-03 DIAGNOSIS — Z30.432 ENCOUNTER FOR IUD REMOVAL: ICD-10-CM

## 2020-06-03 DIAGNOSIS — Z12.4 SCREENING FOR MALIGNANT NEOPLASM OF CERVIX: ICD-10-CM

## 2020-06-03 DIAGNOSIS — Z01.419 WELL WOMAN EXAM: Primary | ICD-10-CM

## 2020-06-03 PROCEDURE — 58301 REMOVE INTRAUTERINE DEVICE: CPT | Performed by: OBSTETRICS & GYNECOLOGY

## 2020-06-03 PROCEDURE — 99395 PREV VISIT EST AGE 18-39: CPT | Performed by: OBSTETRICS & GYNECOLOGY

## 2020-06-03 NOTE — H&P
HPI:  The patient is a 32-year-old female who presents for well woman examination today. Without any major gynecologic complaints. Patient requesting Mirena IUD to be removed. Patient states that her  can feel the strings when she has sex.   She on file    Occupational History      Occupation: Music, [employer]    Social Needs      Financial resource strain: Not on file      Food insecurity:        Worry: Not on file        Inability: Not on file      Transportation needs:        Medical: Not on file constipation  Genitourinary:  denies dysuria, incontinence, abnormal vaginal discharge, vaginal itching  Musculoskeletal:  denies back pain. Skin/Breast:  Denies any breast pain, lumps, or discharge.    Neurological:  denies headaches, extremity weakness Plan:  Discharge instructions were reviewed with the patient.         Assessment/Plan:  iAleen Mena was seen today for gyn exam.    Diagnoses and all orders for this visit:    Well woman exam    Screening for malignant neoplasm of cervix  -     THINPREP PAP WITH

## 2020-06-05 ENCOUNTER — PATIENT MESSAGE (OUTPATIENT)
Dept: OBGYN CLINIC | Facility: CLINIC | Age: 30
End: 2020-06-05

## 2020-06-05 NOTE — TELEPHONE ENCOUNTER
From: Angelica Harvey  To: Paco Villagomez DO  Sent: 6/5/2020 6:07 AM CDT  Subject: Visit Follow-up Question    Hi Dr. Jazmyn Carrero,    I woke up in the middle of the night with heavy bleeding.  Would I be getting my period this close to having my IUD o

## 2020-06-05 NOTE — TELEPHONE ENCOUNTER
Message to [address] [place] [address] as FYI and for any recs. Pt had IUD removed on 6/3/2020. Last night had heavy bleeding. The bleeding has slowed down to a normal period flow. Pt instructed to call if bleeding increases. Any other recs?

## 2020-06-09 PROBLEM — Z34.90 PREGNANCY: Status: RESOLVED | Noted: 2019-08-21 | Resolved: 2020-06-09

## 2020-06-09 PROBLEM — O48.0 POST-DATES PREGNANCY: Status: RESOLVED | Noted: 2019-08-23 | Resolved: 2020-06-09

## 2020-09-01 ENCOUNTER — TELEPHONE (OUTPATIENT)
Dept: OBGYN CLINIC | Facility: CLINIC | Age: 30
End: 2020-09-01

## 2020-09-01 NOTE — TELEPHONE ENCOUNTER
Informed pt of KCB recs below. Pt states she will arrive around 810ish tomorrow morning. Pt aware appt is at [facility]. Pt very appreciative and verbalized understanding.

## 2020-09-01 NOTE — TELEPHONE ENCOUNTER
OK to keep her apt. Pt needs to be masked at all times. Routine screening at the door and check in. If patient can come early at 8:15 as to not be waiting in waiting room and with other people.

## 2020-09-01 NOTE — TELEPHONE ENCOUNTER
Pt has an appt scheduled tomorrow and states her brothers roommate tested positive for COVID last week, states roommate only had a headache, brother and pt with no symptoms.  Please advise [Normal Conjunctiva] : the conjunctiva exhibited no abnormalities [Normal Oropharynx] : normal oropharynx [Eyelids - No Xanthelasma] : the eyelids demonstrated no xanthelasmas [Neck Cervical Mass (___cm)] : no neck mass was observed [Neck Appearance] : the appearance of the neck was normal [Thyroid Diffuse Enlargement] : the thyroid was not enlarged [Jugular Venous Distention Increased] : there was no jugular-venous distention [Thyroid Nodule] : there were no palpable thyroid nodules [Heart Rate And Rhythm] : heart rate and rhythm were normal [Heart Sounds] : normal S1 and S2 [Murmurs] : no murmurs present [Exaggerated Use Of Accessory Muscles For Inspiration] : no accessory muscle use [Respiration, Rhythm And Depth] : normal respiratory rhythm and effort [Auscultation Breath Sounds / Voice Sounds] : lungs were clear to auscultation bilaterally [Abnormal Walk] : normal gait [Gait - Sufficient For Exercise Testing] : the gait was sufficient for exercise testing [Nail Clubbing] : no clubbing of the fingernails [Cyanosis, Localized] : no localized cyanosis [Petechial Hemorrhages (___cm)] : no petechial hemorrhages [] : no ischemic changes [FreeTextEntry1] : has colostomy Awake alert Frail

## 2020-09-01 NOTE — TELEPHONE ENCOUNTER
PT'S BROTHER JUST SPENT THE WEEKEND WITH HER AND FOUND OUT LAST NIGHT THAT HIS ROOMMATE TESTED POSITIVE FOR COVID. PT IS SCHEDULED TOMORROW TO HAVE BREAST LUMP CHECKED. AND PT IS MOVING TO [place] NEXT WEEK SO REALLY WANTS TO KEEP HER APPT TOMORROW.   PT'

## 2020-09-02 ENCOUNTER — TELEPHONE (OUTPATIENT)
Dept: OBGYN CLINIC | Facility: CLINIC | Age: 30
End: 2020-09-02

## 2020-09-02 ENCOUNTER — OFFICE VISIT (OUTPATIENT)
Dept: OBGYN CLINIC | Facility: CLINIC | Age: 30
End: 2020-09-02
Payer: COMMERCIAL

## 2020-09-02 VITALS — HEART RATE: 67 BPM | DIASTOLIC BLOOD PRESSURE: 71 MMHG | SYSTOLIC BLOOD PRESSURE: 105 MMHG

## 2020-09-02 DIAGNOSIS — N63.0 LUMP OR MASS IN BREAST: Primary | ICD-10-CM

## 2020-09-02 DIAGNOSIS — N63.20 LEFT BREAST MASS: Primary | ICD-10-CM

## 2020-09-02 PROCEDURE — 3074F SYST BP LT 130 MM HG: CPT | Performed by: OBSTETRICS & GYNECOLOGY

## 2020-09-02 PROCEDURE — 3078F DIAST BP <80 MM HG: CPT | Performed by: OBSTETRICS & GYNECOLOGY

## 2020-09-02 PROCEDURE — 99213 OFFICE O/P EST LOW 20 MIN: CPT | Performed by: OBSTETRICS & GYNECOLOGY

## 2020-09-02 NOTE — TELEPHONE ENCOUNTER
ORDER NEEDS TO BE   LIMITED ULTRASOUND / DIAGNOSTIC BILATERAL MAMMOGRAM IF NEEDED TO FOLLOW ,  BOTH BREAST

## 2020-09-02 NOTE — TELEPHONE ENCOUNTER
Discussed with BODØ from [employer]. Pt is breastfeeding. She stated to put order a Limited Ultrasound Mike only. Order placed and sent to [facility] to sign off.

## 2020-09-02 NOTE — PROGRESS NOTES
Ny Peña is a 35year old female [id number] No LMP recorded. (Menstrual status: IUD - Intrauterine Device). Patient presents with:  Gyn Problem: lump in breast    Pt is moving to [facility] on Tuesday.  She saw a chiropractor who told her to rub out the sca Transportation needs:        Medical: Not on file        Non-medical: Not on file    Tobacco Use      Smoking status: Never Smoker      Smokeless tobacco: Never Used    Substance and Sexual Activity      Alcohol use: Yes        Comment: SOCIALLY      Drug changes  Left breast: no tenderness, asymmetry, nipple discharge, nipple retractions or skin changes.  Small 2mm mass at 9 o'clock that is mobile but firm   Skin/Hair: no unusual rashes or bruises  Extremities: no edema, no cyanosis  Psychiatric: Appropriat

## 2023-02-16 NOTE — TELEPHONE ENCOUNTER
Pt delivered  on 19 with [address] Pt reports \"pain in my uterus and cramping\" about 45 minutes ago. Pt states the pain and cramping was a dull ache that lasted around 25 minutes and rates 5-6/10. Pt states she took Motrin about 10 minutes after the pain started with relief. Pt states she is breastfeeding. Pt states her bleeding is \"normal and no change since I've been home\". Pt denies, fever, chills or abnormal odor from vagina. Informed pt as she breastfeeds her uterus contracts and the dull ache and cramping she is having  is normal and to be expected. Advised pt to call if she develops fever, body chills, abnormal odor from vagina or increased bleeding. Pt very appreciative and verbalized understanding. Message to [facility](on-call) for sign off or any further recs. negative

## (undated) DEVICE — D AND C PACK: Brand: MEDLINE INDUSTRIES, INC.

## (undated) DEVICE — CURRETTE 12MM CVD

## (undated) DEVICE — SUCTION CANISTER, 3000CC,SAFELINER: Brand: DEROYAL

## (undated) DEVICE — CANISTER SCT BTL SL CAP BRKLY

## (undated) DEVICE — SOL  .9 1000ML BTL

## (undated) DEVICE — Device: Brand: JELCO

## (undated) DEVICE — STERILE LATEX POWDER-FREE SURGICAL GLOVESWITH NITRILE COATING: Brand: PROTEXIS

## (undated) DEVICE — STIRRUP STRAP W/SLING RING

## (undated) DEVICE — COVER SGL STRL LGHT HNDL BLU

## (undated) DEVICE — TUBING SUCTION COLLECTION SET

## (undated) DEVICE — CANISTER SAFETOUCH SYST DISP

## (undated) DEVICE — LUBRICANT JLY SURGILUBE 2OZ

## (undated) NOTE — IP AVS SNAPSHOT
[address]  [address] [facility] ~ [phone]                Discharge Summary   4/2/2017    Carie Files           Admission Information        Provider Department    4/2/2017 Yady Randall MD [facility] 3se      Why ASK your doctor about these medications        Instructions Authorizing Provider    Morning Afternoon Evening As Needed    acetaminophen 500 MG Tabs   Commonly known as:  TYLENOL EXTRA STRENGTH        Take 1,000 mg by mouth.       [    ]    [    ]    [    ] [address] [facility]   [phone]              Immunization History as of 4/4/2017  Never Reviewed    INFLUENZA 10/10/2016    TDAP 1/31/2017      OB Lab Results  (Last 3 results in the past 270 days)    Blood Type Rh Factor Jamie Heart Postpartum Self Care Done   Postpartum Psychosocial and Spiritual Support Done   Postpartum Discharge Done   Community Resources Done   Preeclampsia/Hypertension Done         Labor and Delivery Education  Resolved   Safety Resolved   Epidural Information R and ask to get set up for an insurance coverage that is in-network with Anju Guillaume.         MyChart     Visit [facility]!  You can access your [employer] to more actively manage your health care and view more details from this visit by going to https:/

## (undated) NOTE — LETTER
[facility] OF PUBLIC HEALTH DIVISION OF VITAL RECORDS FETAL DEATH DISPOSITION NOTIFICATION   This form shall be used to notify a mother of her disposition rights and options after experiencing aspontaneous fetal demise of less than 20 completed Printed: 4/23/2018      Medical Record #: [id number]                                              Page 1 of 1

## (undated) NOTE — LETTER
Naun Loza [address] [facility], [facility] Kacy ORONA    CONSENT FOR EXAMINATION AND DISPOSITION OF FETUS                    I hereby joselyn permission to [facility], to perform an examination of the fetus de Patient Name: Angelica Harvey     : 4/15/1990                 Printed: 2018      Medical Record #: [id number]                                              Page 1 of 1

## (undated) NOTE — LETTER
[address] [place] [address] Rd, [place], [place]     AUTHORIZATION FOR SURGICAL OPERATION OR PROCEDURE    I hereby authorize Dr. Lee Lyn MD, my Physician(s) and whomever may be designated as the doctor's Assistant, to perform the fo 4. I consent to the photographing of procedure(s) to be performed for the purposes of advancing medicine, science and/or education, provided my identity is not revealed.  If the procedure has been videotaped, the physician/surgeon will obtain the original v (Witness signature)                                                                                                  (Date)                                (Time)  STATEMENT OF PHYSICIAN My signature below affirms that prior to the time of the procedure;  I

## (undated) NOTE — LETTER
VACCINE ADMINISTRATION RECORD  PARENT / GUARDIAN APPROVAL  Date: 7/10/2019  Vaccine administered to:  Lazaro Corrigan     : 4/15/1990    MRN: [id number]    A copy of the appropriate Centers for Disease Control and Prevention Vaccine Information state

## (undated) NOTE — LETTER
AUTHORIZATION FOR SURGICAL OPERATION OR OTHER PROCEDURE    1.  I hereby authorize Dr. Gerry Leyva, and [facility] assigned to my case to perform the following operation and/or procedure [facility]:    ______IUD INSERTION___________ Time:  ________ A. M.  P.M.        Patient Name:  ______________________________________________________  (please print)      Patient signature:  ___________________________________________________             Relationship to Patient:

## (undated) NOTE — LETTER
AUTHORIZATION FOR SURGICAL OPERATION OR OTHER PROCEDURE    1.  I hereby authorize Dr. Jolie Qiu, and [facility] assigned to my case to perform the following operation and/or procedure at the [facility]:    ______________________IUD [facility] Time:  ________ A. M.  P.M.        Patient Name:  ______________________________________________________  (please print)      Patient signature:  ___________________________________________________             Relationship to Patient:

## (undated) NOTE — LETTER
Viral Baumann Md  [address]  [facility], [facility]       08/22/17        Patient: Amara Erazo   YOB: 1990   Date of Visit: 8/22/2017       Dear  Dr. Charline Saavedra MD,      Thank you for referring Amara Erazo

## (undated) NOTE — LETTER
[facility]  Administration of Anesthesia  1.  Demond Martinez, or _________________________________ acting on her behalf, (Patient) (Dependent/Representative) request to receive anesthesia for my pending procedure/operation/treatment infections, high spinal block, spinal bleeding, seizure, cardiac arrest and death. 7. AWARENESS: I understand that it is possible (but unlikely) to have explicit memory of events from the operating room while under general anesthesia.   8. ELECTROCONVULSIV unconscious pt /Relationship    My signature below affirms that prior to the time of the procedure, I have explained to the patient and/or his/her guardian, the risks and benefits of undergoing anesthesia, as well as any reasonable alternatives.     _______

## (undated) NOTE — LETTER
Naun Loza [address]  [place] [address] [place], [facility], [facility]  [id number]  INFORMED CONSENT FOR TRANSFUSION OF BLOOD OR BLOOD PRODUCTS  My physician has informed me of the nature, purpose, benefits and risks of transfusion for blood and blood components that ______________________________________________  (Signature of Patient)                                                            (Responsible party in case of Minor,

## (undated) NOTE — LETTER
[facility]       07[facility]        Patient: José Luis Gr   YOB: 1990   Date of Visit: 6/30/2017       Dear  Dr. Belinda Singh MD,      Thank you for referring Abbietamir Sakshi

## (undated) NOTE — Clinical Note
VACCINE ADMINISTRATION RECORD  PARENT / GUARDIAN APPROVAL  Date: 2017  Vaccine administered to:  José Cooper     : 4/15/1990    MRN: [id number]    A copy of the appropriate Centers for Disease Control and Prevention Vaccine Information statement h